# Patient Record
Sex: FEMALE | Race: WHITE | Employment: PART TIME | URBAN - METROPOLITAN AREA
[De-identification: names, ages, dates, MRNs, and addresses within clinical notes are randomized per-mention and may not be internally consistent; named-entity substitution may affect disease eponyms.]

---

## 2020-04-06 LAB — LDL-C, EXTERNAL: 93

## 2020-07-30 LAB — CREATININE, EXTERNAL: 0.8

## 2021-05-03 ENCOUNTER — DOCUMENTATION ONLY (OUTPATIENT)
Dept: INTERNAL MEDICINE CLINIC | Age: 61
End: 2021-05-03

## 2021-05-04 ENCOUNTER — OFFICE VISIT (OUTPATIENT)
Dept: INTERNAL MEDICINE CLINIC | Age: 61
End: 2021-05-04
Payer: COMMERCIAL

## 2021-05-04 ENCOUNTER — HOSPITAL ENCOUNTER (OUTPATIENT)
Dept: LAB | Age: 61
Discharge: HOME | End: 2021-05-04
Payer: COMMERCIAL

## 2021-05-04 VITALS
SYSTOLIC BLOOD PRESSURE: 122 MMHG | WEIGHT: 123.4 LBS | DIASTOLIC BLOOD PRESSURE: 78 MMHG | TEMPERATURE: 97.7 F | RESPIRATION RATE: 16 BRPM | HEIGHT: 68 IN | BODY MASS INDEX: 18.7 KG/M2 | OXYGEN SATURATION: 97 % | HEART RATE: 84 BPM

## 2021-05-04 DIAGNOSIS — L65.8 FEMALE PATTERN HAIR LOSS: ICD-10-CM

## 2021-05-04 DIAGNOSIS — E78.5 HYPERLIPIDEMIA, UNSPECIFIED HYPERLIPIDEMIA TYPE: ICD-10-CM

## 2021-05-04 DIAGNOSIS — G47.00 INSOMNIA, UNSPECIFIED TYPE: ICD-10-CM

## 2021-05-04 DIAGNOSIS — Z00.00 ENCOUNTER FOR ROUTINE HISTORY AND PHYSICAL EXAM IN FEMALE: Primary | ICD-10-CM

## 2021-05-04 DIAGNOSIS — F32.0 CURRENT MILD EPISODE OF MAJOR DEPRESSIVE DISORDER WITHOUT PRIOR EPISODE (HCC): ICD-10-CM

## 2021-05-04 DIAGNOSIS — R63.4 WEIGHT LOSS, UNINTENTIONAL: ICD-10-CM

## 2021-05-04 DIAGNOSIS — Z00.00 LABORATORY TESTS ORDERED AS PART OF A COMPLETE PHYSICAL EXAM (CPE): ICD-10-CM

## 2021-05-04 DIAGNOSIS — Z11.59 NEED FOR HEPATITIS C SCREENING TEST: ICD-10-CM

## 2021-05-04 DIAGNOSIS — E55.9 VITAMIN D DEFICIENCY: ICD-10-CM

## 2021-05-04 DIAGNOSIS — Z00.00 ENCOUNTER FOR ROUTINE HISTORY AND PHYSICAL EXAM IN FEMALE: ICD-10-CM

## 2021-05-04 PROBLEM — F32.A DEPRESSION: Status: ACTIVE | Noted: 2021-05-04

## 2021-05-04 LAB
25(OH)D3 SERPL-MCNC: 19.5 NG/ML (ref 30–100)
ALBUMIN SERPL-MCNC: 4.2 G/DL (ref 3.4–5)
ALBUMIN/GLOB SERPL: 1.4 {RATIO} (ref 0.8–1.7)
ALP SERPL-CCNC: 81 U/L (ref 45–117)
ALT SERPL-CCNC: 25 U/L (ref 13–56)
ANION GAP SERPL CALC-SCNC: 7 MMOL/L (ref 3–18)
APPEARANCE UR: CLEAR
AST SERPL-CCNC: 21 U/L (ref 10–38)
BASOPHILS # BLD: 0 K/UL (ref 0–0.1)
BASOPHILS NFR BLD: 1 % (ref 0–2)
BILIRUB SERPL-MCNC: 0.7 MG/DL (ref 0.2–1)
BILIRUB UR QL: NEGATIVE
BUN SERPL-MCNC: 18 MG/DL (ref 7–18)
BUN/CREAT SERPL: 27 (ref 12–20)
CALCIUM SERPL-MCNC: 9.3 MG/DL (ref 8.5–10.1)
CHLORIDE SERPL-SCNC: 106 MMOL/L (ref 100–111)
CHOLEST SERPL-MCNC: 156 MG/DL
CO2 SERPL-SCNC: 26 MMOL/L (ref 21–32)
COLOR UR: YELLOW
CREAT SERPL-MCNC: 0.67 MG/DL (ref 0.6–1.3)
DIFFERENTIAL METHOD BLD: ABNORMAL
EOSINOPHIL # BLD: 0.1 K/UL (ref 0–0.4)
EOSINOPHIL NFR BLD: 1 % (ref 0–5)
ERYTHROCYTE [DISTWIDTH] IN BLOOD BY AUTOMATED COUNT: 12.3 % (ref 11.6–14.5)
GLOBULIN SER CALC-MCNC: 3.1 G/DL (ref 2–4)
GLUCOSE SERPL-MCNC: 86 MG/DL (ref 74–99)
GLUCOSE UR STRIP.AUTO-MCNC: NEGATIVE MG/DL
HCT VFR BLD AUTO: 43.1 % (ref 35–45)
HDLC SERPL-MCNC: 79 MG/DL (ref 40–60)
HDLC SERPL: 2 {RATIO} (ref 0–5)
HGB BLD-MCNC: 13.9 G/DL (ref 12–16)
HGB UR QL STRIP: NEGATIVE
KETONES UR QL STRIP.AUTO: NEGATIVE MG/DL
LDLC SERPL CALC-MCNC: 66.4 MG/DL (ref 0–100)
LEUKOCYTE ESTERASE UR QL STRIP.AUTO: NEGATIVE
LIPID PROFILE,FLP: ABNORMAL
LYMPHOCYTES # BLD: 1.7 K/UL (ref 0.9–3.6)
LYMPHOCYTES NFR BLD: 29 % (ref 21–52)
MCH RBC QN AUTO: 30 PG (ref 24–34)
MCHC RBC AUTO-ENTMCNC: 32.3 G/DL (ref 31–37)
MCV RBC AUTO: 92.9 FL (ref 74–97)
MONOCYTES # BLD: 0.6 K/UL (ref 0.05–1.2)
MONOCYTES NFR BLD: 11 % (ref 3–10)
NEUTS SEG # BLD: 3.3 K/UL (ref 1.8–8)
NEUTS SEG NFR BLD: 58 % (ref 40–73)
NITRITE UR QL STRIP.AUTO: NEGATIVE
PH UR STRIP: 5.5 [PH] (ref 5–8)
PLATELET # BLD AUTO: 345 K/UL (ref 135–420)
PMV BLD AUTO: 9.5 FL (ref 9.2–11.8)
POTASSIUM SERPL-SCNC: 4.8 MMOL/L (ref 3.5–5.5)
PROT SERPL-MCNC: 7.3 G/DL (ref 6.4–8.2)
PROT UR STRIP-MCNC: NEGATIVE MG/DL
RBC # BLD AUTO: 4.64 M/UL (ref 4.2–5.3)
SODIUM SERPL-SCNC: 139 MMOL/L (ref 136–145)
SP GR UR REFRACTOMETRY: 1.01 (ref 1–1.03)
TRIGL SERPL-MCNC: 53 MG/DL (ref ?–150)
TSH SERPL DL<=0.05 MIU/L-ACNC: 1.61 UIU/ML (ref 0.36–3.74)
UROBILINOGEN UR QL STRIP.AUTO: 0.2 EU/DL (ref 0.2–1)
VLDLC SERPL CALC-MCNC: 10.6 MG/DL
WBC # BLD AUTO: 5.7 K/UL (ref 4.6–13.2)

## 2021-05-04 PROCEDURE — 84402 ASSAY OF FREE TESTOSTERONE: CPT

## 2021-05-04 PROCEDURE — 85025 COMPLETE CBC W/AUTO DIFF WBC: CPT

## 2021-05-04 PROCEDURE — 82306 VITAMIN D 25 HYDROXY: CPT

## 2021-05-04 PROCEDURE — 99386 PREV VISIT NEW AGE 40-64: CPT | Performed by: INTERNAL MEDICINE

## 2021-05-04 PROCEDURE — 82626 DEHYDROEPIANDROSTERONE: CPT

## 2021-05-04 PROCEDURE — 80053 COMPREHEN METABOLIC PANEL: CPT

## 2021-05-04 PROCEDURE — 84443 ASSAY THYROID STIM HORMONE: CPT

## 2021-05-04 PROCEDURE — 81003 URINALYSIS AUTO W/O SCOPE: CPT

## 2021-05-04 PROCEDURE — 80061 LIPID PANEL: CPT

## 2021-05-04 RX ORDER — SPIRONOLACTONE 100 MG/1
TABLET, FILM COATED ORAL
COMMUNITY
Start: 2021-03-31

## 2021-05-04 RX ORDER — ESTRADIOL 0.1 MG/G
CREAM VAGINAL
COMMUNITY
Start: 2021-02-01 | End: 2021-07-10 | Stop reason: ALTCHOICE

## 2021-05-04 RX ORDER — NAPROXEN SODIUM 550 MG/1
550 TABLET ORAL
COMMUNITY
Start: 2020-09-22

## 2021-05-04 RX ORDER — ESZOPICLONE 2 MG/1
TABLET, FILM COATED ORAL
COMMUNITY
Start: 2021-04-06 | End: 2021-05-25 | Stop reason: SDUPTHER

## 2021-05-04 RX ORDER — SERTRALINE HYDROCHLORIDE 25 MG/1
25 TABLET, FILM COATED ORAL DAILY
Qty: 90 TAB | Refills: 2 | Status: SHIPPED | OUTPATIENT
Start: 2021-05-04 | End: 2021-10-03 | Stop reason: ALTCHOICE

## 2021-05-04 RX ORDER — DUTASTERIDE 0.5 MG/1
CAPSULE, LIQUID FILLED ORAL
COMMUNITY
Start: 2021-03-31

## 2021-05-04 RX ORDER — ATORVASTATIN CALCIUM 20 MG/1
TABLET, FILM COATED ORAL
COMMUNITY
Start: 2021-03-15 | End: 2021-05-25 | Stop reason: SDUPTHER

## 2021-05-04 NOTE — PROGRESS NOTES
1. Have you been to the ER, urgent care clinic or hospitalized since your last visit? NO.     2. Have you seen or consulted any other health care providers outside of the [address] [place] [address] since your last visit (Include any pap smears or colon screening)?  Dr. Leonor Matthews Courts former PCP

## 2021-05-04 NOTE — PATIENT INSTRUCTIONS
Heart-Healthy Diet: Care Instructions Your Care Instructions A heart-healthy diet has lots of vegetables, fruits, nuts, beans, and whole grains, and is low in salt. It limits foods that are high in saturated fat, such as meats, cheeses, and fried foods. It may be hard to change your diet, but even small changes can lower your risk of heart attack and heart disease. Follow-up care is a key part of your treatment and safety. Be sure to make and go to all appointments, and call your doctor if you are having problems. It's also a good idea to know your test results and keep a list of the medicines you take. How can you care for yourself at home? Watch your portions · Learn what a serving is. A \"serving\" and a \"portion\" are not always the same thing. Make sure that you are not eating larger portions than are recommended. For example, a serving of pasta is ½ cup. A serving size of meat is 2 to 3 ounces. A 3-ounce serving is about the size of a deck of cards. Measure serving sizes until you are good at [place]" them. Keep in mind that restaurants often serve portions that are 2 or 3 times the size of one serving. · To keep your energy level up and keep you from feeling hungry, eat often but in smaller portions. · Eat only the number of calories you need to stay at a healthy weight. If you need to lose weight, eat fewer calories than your body burns (through exercise and other physical activity). Eat more fruits and vegetables · Eat a variety of fruit and vegetables every day. Dark green, deep orange, red, or yellow fruits and vegetables are especially good for you. Examples include spinach, carrots, peaches, and berries. · Keep carrots, celery, and other veggies handy for snacks. Buy fruit that is in season and store it where you can see it so that you will be tempted to eat it. · Cook dishes that have a lot of veggies in them, such as stir-fries and soups. Limit saturated and trans fat · Read food labels, and try to avoid saturated and trans fats. They increase your risk of heart disease. · Use olive or canola oil when you cook. · Bake, broil, grill, or steam foods instead of frying them. · Choose lean meats instead of high-fat meats such as hot dogs and sausages. Cut off all visible fat when you prepare meat. · Eat fish, skinless poultry, and meat alternatives such as soy products instead of high-fat meats. Soy products, such as tofu, may be especially good for your heart. · Choose low-fat or fat-free milk and dairy products. Eat foods high in fiber · Eat a variety of grain products every day. Include whole-grain foods that have lots of fiber and nutrients. Examples of whole-grain foods include oats, whole wheat bread, and brown rice. · Buy whole-grain breads and cereals, instead of white bread or pastries. Limit salt and sodium · Limit how much salt and sodium you eat to help lower your blood pressure. · Taste food before you salt it. Add only a little salt when you think you need it. With time, your taste buds will adjust to less salt. · Eat fewer snack items, fast foods, and other high-salt, processed foods. Check food labels for the amount of sodium in packaged foods. · Choose low-sodium versions of canned goods (such as soups, vegetables, and beans). Limit sugar · Limit drinks and foods with added sugar. These include candy, desserts, and soda pop. Limit alcohol · Limit alcohol to no more than 2 drinks a day for men and 1 drink a day for women. Too much alcohol can cause health problems. When should you call for help? Watch closely for changes in your health, and be sure to contact your doctor if: 
  · You would like help planning heart-healthy meals. Where can you learn more? Go to http://www.LifeBio.com/ Enter V137 in the search box to learn more about \"Heart-Healthy Diet: Care Instructions. \" Current as of: August 22, 2019               Content Version: 12.6 © 7639-5292 [employer]. Care instructions adapted under license by [employer] (which disclaims liability or warranty for this information). If you have questions about a medical condition or this instruction, always ask your healthcare professional. [address] any warranty or liability for your use of this information. Learning About Low-Sodium Foods What foods are low in sodium? The foods you eat contain nutrients, such as vitamins and minerals. Sodium is a nutrient. Your body needs the right amount to stay healthy and work as it should. You can use the list below to help you make choices about which foods to eat. Fruits · Fresh, frozen, canned, or dried fruit Vegetables · Fresh or frozen vegetables, with no added salt · Canned vegetables, low-sodium or with no added salt Grains · Bagels without salted tops · Cereal with no added salt · Corn tortillas · Crackers with no added salt · Oatmeal, cooked without salt · Popcorn with no salt · Pasta and noodles, cooked without salt · Rice, cooked without salt · Unsalted pretzels Dairy and dairy alternatives · Butter, unsalted · Cream cheese · Ice cream 
· Milk · Soy milk Meats and other protein foods · Beans and peas, canned with no salt · Eggs · Fresh fish (not smoked) · Fresh meats (not smoked or cured) · Nuts and nut butter, prepared without salt · Poultry, not packaged with sodium solution · Tofu, unseasoned · Tuna, canned without salt Seasonings · Garlic · Herbs and spices · Lemon juice · Mustard · Olive oil · Salt-free seasoning mixes · Vinegar Work with your doctor to find out how much of this nutrient you need. Depending on your health, you may need more or less of it in your diet. Where can you learn more? Go to http://www.gray.com/ Enter D573 in the search box to learn more about \"Learning About Low-Sodium Foods. \" Current as of: August 22, 2019               Content Version: 12.6 © 0977-0856 [email or web address]. Care instructions adapted under license by [employer] (which disclaims liability or warranty for this information). If you have questions about a medical condition or this instruction, always ask your healthcare professional. [address] any warranty or liability for your use of this information. Low Sodium Diet (2,000 Milligram): Care Instructions Your Care Instructions Too much sodium causes your body to hold on to extra water. This can raise your blood pressure and force your heart and kidneys to work harder. In very serious cases, this could cause you to be put in the hospital. It might even be life-threatening. By limiting sodium, you will feel better and lower your risk of serious problems. The most common source of sodium is salt. People get most of the salt in their diet from canned, prepared, and packaged foods. Fast food and restaurant meals also are very high in sodium. Your doctor will probably limit your sodium to less than 2,000 milligrams (mg) a day. This limit counts all the sodium in prepared and packaged foods and any salt you add to your food. Follow-up care is a key part of your treatment and safety. Be sure to make and go to all appointments, and call your doctor if you are having problems. It's also a good idea to know your test results and keep a list of the medicines you take. How can you care for yourself at home? Read food labels · Read labels on cans and food packages. The labels tell you how much sodium is in each serving. Make sure that you look at the serving size. If you eat more than the serving size, you have eaten more sodium. · Food labels also tell you the Percent Daily Value for sodium. Choose products with low Percent Daily Values for sodium.  
· Be aware that sodium can come in forms other than salt, including monosodium glutamate (MSG), sodium citrate, and sodium bicarbonate (baking soda). MSG is often added to Asian food. When you eat out, you can sometimes ask for food without MSG or added salt. Buy low-sodium foods · Buy foods that are labeled \"unsalted\" (no salt added), \"sodium-free\" (less than 5 mg of sodium per serving), or \"low-sodium\" (less than 140 mg of sodium per serving). Foods labeled \"reduced-sodium\" and \"light sodium\" may still have too much sodium. Be sure to read the label to see how much sodium you are getting. · Buy fresh vegetables, or frozen vegetables without added sauces. Buy low-sodium versions of canned vegetables, soups, and other canned goods. Prepare low-sodium meals · Cut back on the amount of salt you use in cooking. This will help you adjust to the taste. Do not add salt after cooking. One teaspoon of salt has about 2,300 mg of sodium. · Take the salt shaker off the table. · Flavor your food with garlic, lemon juice, onion, vinegar, herbs, and spices. Do not use soy sauce, lite soy sauce, steak sauce, onion salt, garlic salt, celery salt, mustard, or ketchup on your food. · Use low-sodium salad dressings, sauces, and ketchup. Or make your own salad dressings and sauces without adding salt. · Use less salt (or none) when recipes call for it. You can often use half the salt a recipe calls for without losing flavor. Other foods such as rice, pasta, and grains do not need added salt. · Rinse canned vegetables, and cook them in fresh water. This removes somebut not allof the salt. · Avoid water that is naturally high in sodium or that has been treated with water softeners, which add sodium. Call your local water company to find out the sodium content of your water supply. If you buy bottled water, read the label and choose a sodium-free brand. Avoid high-sodium foods · Avoid eating: 
? Smoked, cured, salted, and canned meat, fish, and poultry. ? Ham, guzman, hot dogs, and luncheon meats.  
? Regular, hard, and processed cheese and regular peanut butter. ? Crackers with salted tops, and other salted snack foods such as pretzels, chips, and salted popcorn. ? Frozen prepared meals, unless labeled low-sodium. ? Canned and dried soups, broths, and bouillon, unless labeled sodium-free or low-sodium. ? Canned vegetables, unless labeled sodium-free or low-sodium. ? [place] fries, pizza, tacos, and other fast foods. ? Pickles, olives, ketchup, and other condiments, especially soy sauce, unless labeled sodium-free or low-sodium. Where can you learn more? Go to http://www.gray.com/ Enter S471 in the search box to learn more about \"Low Sodium Diet (2,000 Milligram): Care Instructions. \" Current as of: August 22, 2019               Content Version: 12.6 © 6038-5563 [employer]. Care instructions adapted under license by [employer] (which disclaims liability or warranty for this information). If you have questions about a medical condition or this instruction, always ask your healthcare professional. [place] [address] any warranty or liability for your use of this information. High Cholesterol: Care Instructions Your Care Instructions Cholesterol is a type of fat in your blood. It is needed for many body functions, such as making new cells. Cholesterol is made by your body. It also comes from food you eat. High cholesterol means that you have too much of the fat in your blood. This raises your risk of a heart attack and stroke. LDL and HDL are part of your total cholesterol. LDL is the \"bad\" cholesterol. High LDL can raise your risk for heart disease, heart attack, and stroke. HDL is the \"good\" cholesterol. It helps clear bad cholesterol from the body. High HDL is linked with a lower risk of heart disease, heart attack, and stroke.  
Your cholesterol levels help your doctor find out your risk for having a heart attack or stroke. You and your doctor can talk about whether you need to lower your risk and what treatment is best for you. A heart-healthy lifestyle along with medicines can help lower your cholesterol and your risk. The way you choose to lower your risk will depend on how high your risk is for heart attack and stroke. It will also depend on how you feel about taking medicines. Follow-up care is a key part of your treatment and safety. Be sure to make and go to all appointments, and call your doctor if you are having problems. It's also a good idea to know your test results and keep a list of the medicines you take. How can you care for yourself at home? · Eat a variety of foods every day. Good choices include fruits, vegetables, whole grains (like oatmeal), dried beans and peas, nuts and seeds, soy products (like tofu), and fat-free or low-fat dairy products. · Replace butter, margarine, and hydrogenated or partially hydrogenated oils with olive and canola oils. (Canola oil margarine without trans fat is fine.) · Replace red meat with fish, poultry, and soy protein (like tofu). · Limit processed and packaged foods like chips, crackers, and cookies. · Bake, broil, or steam foods. Don't lopez them. · Be physically active. Get at least 30 minutes of exercise on most days of the week. Walking is a good choice. You also may want to do other activities, such as running, swimming, cycling, or playing tennis or team sports. · Stay at a healthy weight or lose weight by making the changes in eating and physical activity listed above. Losing just a small amount of weight, even 5 to 10 pounds, can reduce your risk for having a heart attack or stroke. · Do not smoke. When should you call for help? Watch closely for changes in your health, and be sure to contact your doctor if: 
  · You need help making lifestyle changes. · You have questions about your medicine. Where can you learn more?  
Go to http://www.gray.com/ Enter H417 in the search box to learn more about \"High Cholesterol: Care Instructions. \" Current as of: December 16, 2019               Content Version: 12.6 © 6110-7498 [employer]. Care instructions adapted under license by [employer] (which disclaims liability or warranty for this information). If you have questions about a medical condition or this instruction, always ask your healthcare professional. [address] any warranty or liability for your use of this information.

## 2021-05-05 ENCOUNTER — PATIENT MESSAGE (OUTPATIENT)
Dept: INTERNAL MEDICINE CLINIC | Age: 61
End: 2021-05-05

## 2021-05-05 RX ORDER — ERGOCALCIFEROL 1.25 MG/1
50000 CAPSULE ORAL
Qty: 12 CAP | Refills: 1 | Status: SHIPPED | OUTPATIENT
Start: 2021-05-05 | End: 2022-05-08 | Stop reason: ALTCHOICE

## 2021-05-06 LAB — TESTOST FREE SERPL-MCNC: 1.1 PG/ML (ref 0–4.2)

## 2021-05-07 LAB — DHEA SERPL-MCNC: 95 NG/DL (ref 31–701)

## 2021-05-10 ENCOUNTER — TELEPHONE (OUTPATIENT)
Dept: INTERNAL MEDICINE CLINIC | Age: 61
End: 2021-05-10

## 2021-05-10 PROBLEM — R63.4 WEIGHT LOSS, UNINTENTIONAL: Status: ACTIVE | Noted: 2021-05-10

## 2021-05-10 NOTE — TELEPHONE ENCOUNTER
Have requested records. Dr Natacha Vincent is who performed colonoscopy in 2019 and have requested that report.

## 2021-05-10 NOTE — TELEPHONE ENCOUNTER
Please request the following for health maintenance:    1. Mammogram from [facility] (completed 1/28/2021 in [facility])    2. Pap smear/well woman exam from Dr. Jose Nava    3. Colonoscopy report - patient believes it was performed near [facility] two years ago. Likely with [facility] ([facility]) or with [facility]an Forman's group. Please see if can locate. Thank you!

## 2021-05-10 NOTE — PROGRESS NOTES
HPI:   Dana Bradford is a 59y.o. year old female who presents today for a physical exam and to establish care. She is the sister of Divine Worthy and Winter Tejada. She was previously under the care of Berkley Aguirre at [place]. She has a history of hyperlipidemia, insomnia, and androgenic hair loss. She reports that she is doing reasonably well. She has completed the Moderna COVID 19 vaccine series. She does report some symptoms of depression since 10/2020, characterized by sadness, decreased appetite, and decreased weight, which she attributes partly to isolation related to the pandemic. She is no longer exercising, and previously had been walking 3-5 miles per day. She continues to have difficulty with insomnia, and will use Lunesta approximately 2-3 times per month. She does not feel anxiety is contributing to her symptoms. She is otherwise without specific complaints. She has a history of hyperlipidemia, treated with moderate intensity dose atorvastatin. She remains active, and denies any chest pain, shortness of breath at rest or with exertion, palpitations, lightheadedness, or edema. She has a history of migraine headaches, mainly characterized by right sided pain, but states that these have diminished significantly since completing menopause. She denies any aura or visual changes associated with headaches, and had been using naproxen as needed with good response. She has a history of androgenic hair loss, and is currently being treated with spironolactone and dutasteride. She is under the care of Dr. Dale Stubbs. She has a family history of colon cancer in her father and reports having regular colonoscopies. She states that her last colonoscopy was approximately 2 years ago (report unavailable). She has regular well women exams with Dr. Tiffany Thompson and eye exams with Dr. Zenon Hsieh.      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 No pertinent surgical history. Current Outpatient Medications   Medication Sig    spironolactone (ALDACTONE) 100 mg tablet TAKE 1 TABLET BY MOUTH ONCE DAILY    dutasteride (AVODART) 0.5 mg capsule TAKE 1 CAPSULE BY MOUTH ONCE DAILY    atorvastatin (LIPITOR) 20 mg tablet TAKE 1 TABLET BY MOUTH ONCE DAILY AT BEDTIME    eszopiclone (LUNESTA) 2 mg tablet TAKE 1 TABLET BY MOUTH ONCE DAILY AT BEDTIME    naproxen sodium (ANAPROX) 550 mg tablet Take 550 mg by mouth.  estradioL (ESTRACE) 0.01 % (0.1 mg/gram) vaginal cream     sertraline (ZOLOFT) 25 mg tablet Take 1 Tab by mouth daily.  ergocalciferol (ERGOCALCIFEROL) 1,250 mcg (50,000 unit) capsule Take 1 Cap by mouth every seven (7) days. Indications: vitamin D deficiency (high dose therapy)     No current facility-administered medications for this visit. Allergies and Intolerances:   Not on File     Family History: She has no family history of breast cancer. She does have a FH of hyperlipidemia and hypertension. Family History   Problem Relation Age of Onset    Dementia Mother     Colon Cancer Father      Social History:   She  reports that she has never smoked. She has never used smokeless tobacco. She is a . She has two adult daughters. 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 She never smoked and will drink alcohol occasionally, approximately 2 glasses of wine per month.      Social History     Substance and Sexual Activity   Alcohol Use Yes    Frequency: Monthly or less       Immunization History:  Immunization History   Administered Date(s) Administered    Covid-19, MODERNA, Mrna, Lnp-s, Pf, 100mcg/0.5mL 01/14/2021, 02/11/2021    Influenza Vaccine 09/05/2019    Influenza Vaccine (>6 mo Afluria QUAD Vial 55629 (0.25 mL) / 93208 (0.5 mL)) 08/11/2020    Tdap 10/30/2018       Review of Systems:   As above included in HPI. Otherwise 11 point review of systems negative including constitutional, skin, HENT, eyes, respiratory, cardiovascular, gastrointestinal, genitourinary, musculoskeletal, endocrine, hematologic, allergy, and neurologic. Physical:   Visit Vitals  /78   Pulse 84   Temp 97.7 °F (36.5 °C) (Temporal)   Resp 16   Ht 5' 8.3\" (1.735 m)   Wt 123 lb 6.4 oz (56 kg)   SpO2 97%   BMI 18.60 kg/m²       Exam:   Patient appears in no apparent distress. Affect is appropriate. HEENT --Anicteric sclerae, tympanic membranes normal,  ear canals normal.  PERRL, EOMI, conjunctiva and lids normal.  No cervical lymphadenopathy. No thyromegaly, JVD, or bruits. Carotid pulses 2+ with normal upstroke. Lungs --Clear to auscultation. No wheezing or rales. Heart --Regular rate and rhythm, no murmurs, rubs, gallops, or clicks. Abdomen -- Soft and nontender, no hepatosplenomegaly or masses. Extremities -- Without cyanosis, clubbing, edema. Normal looking digits, ROM intact  Neuro -- CN 2-12 intact, strength 5/5 with intact soft touch in all extremities  Derm  no obvious abnormalities noted, no rash      Review of Data:  Labs:  Hospital Outpatient Visit on 05/04/2021   Component Date Value Ref Range Status    WBC 05/04/2021 5.7  4.6 - 13.2 K/uL Final    RBC 05/04/2021 4.64  4.20 - 5.30 M/uL Final    HGB 05/04/2021 13.9  12.0 - 16.0 g/dL Final    HCT 05/04/2021 43.1  35.0 - 45.0 % Final    MCV 05/04/2021 92.9  74.0 - 97.0 FL Final    MCH 05/04/2021 30.0  24.0 - 34.0 PG Final    MCHC 05/04/2021 32.3  31.0 - 37.0 g/dL Final    RDW 05/04/2021 12.3  11.6 - 14.5 % Final    PLATELET 61/26/0109 260  135 - 420 K/uL Final    MPV 05/04/2021 9.5  9.2 - 11.8 FL Final    NEUTROPHILS 05/04/2021 58  40 - 73 % Final    LYMPHOCYTES 05/04/2021 29  21 - 52 % Final    MONOCYTES 05/04/2021 11* 3 - 10 % Final    EOSINOPHILS 05/04/2021 1  0 - 5 % Final    BASOPHILS 05/04/2021 1  0 - 2 % Final    ABS. NEUTROPHILS 05/04/2021 3.3  1.8 - 8.0 K/UL Final    ABS. LYMPHOCYTES 05/04/2021 1.7  0.9 - 3.6 K/UL Final    ABS. MONOCYTES 05/04/2021 0.6  0.05 - 1.2 K/UL Final    ABS. EOSINOPHILS 05/04/2021 0.1  0.0 - 0.4 K/UL Final    ABS. BASOPHILS 05/04/2021 0.0  0.0 - 0.1 K/UL Final    DF 05/04/2021 AUTOMATED    Final    LIPID PROFILE 05/04/2021        Final    Cholesterol, total 05/04/2021 156  <200 MG/DL Final    Triglyceride 05/04/2021 53  <150 MG/DL Final    HDL Cholesterol 05/04/2021 79* 40 - 60 MG/DL Final    LDL, calculated 05/04/2021 66.4  0 - 100 MG/DL Final    VLDL, calculated 05/04/2021 10.6  MG/DL Final    CHOL/HDL Ratio 05/04/2021 2.0  0 - 5.0   Final    Sodium 05/04/2021 139  136 - 145 mmol/L Final    Potassium 05/04/2021 4.8  3.5 - 5.5 mmol/L Final    Chloride 05/04/2021 106  100 - 111 mmol/L Final    CO2 05/04/2021 26  21 - 32 mmol/L Final    Anion gap 05/04/2021 7  3.0 - 18 mmol/L Final    Glucose 05/04/2021 86  74 - 99 mg/dL Final    BUN 05/04/2021 18  7.0 - 18 MG/DL Final    Creatinine 05/04/2021 0.67  0.6 - 1.3 MG/DL Final    BUN/Creatinine ratio 05/04/2021 27* 12 - 20   Final    GFR est AA 05/04/2021 >60  >60 ml/min/1.73m2 Final    GFR est non-AA 05/04/2021 >60  >60 ml/min/1.73m2 Final    Calcium 05/04/2021 9.3  8.5 - 10.1 MG/DL Final    Bilirubin, total 05/04/2021 0.7  0.2 - 1.0 MG/DL Final    ALT (SGPT) 05/04/2021 25  13 - 56 U/L Final    AST (SGOT) 05/04/2021 21  10 - 38 U/L Final    Alk.  phosphatase 05/04/2021 81  45 - 117 U/L Final    Protein, total 05/04/2021 7.3  6.4 - 8.2 g/dL Final    Albumin 05/04/2021 4.2  3.4 - 5.0 g/dL Final    Globulin 05/04/2021 3.1  2.0 - 4.0 g/dL Final    A-G Ratio 05/04/2021 1.4  0.8 - 1.7   Final    TSH 05/04/2021 1.61  0.36 - 3.74 uIU/mL Final    Vitamin D 25-Hydroxy 05/04/2021 19.5* 30 - 100 ng/mL Final    Free testosterone (Direct) 05/04/2021 1.1  0.0 - 4.2 pg/mL Final    Dehydroepiandrosterone (DHEA) 05/04/2021 95  31 - 701 ng/dL Final    Color 05/04/2021 YELLOW    Final    Appearance 05/04/2021 CLEAR    Final    Specific gravity 05/04/2021 1.013  1.005 - 1.030   Final    pH (UA) 05/04/2021 5.5  5.0 - 8.0   Final    Protein 05/04/2021 Negative  NEG mg/dL Final    Glucose 05/04/2021 Negative  NEG mg/dL Final    Ketone 05/04/2021 Negative  NEG mg/dL Final    Bilirubin 05/04/2021 Negative  NEG   Final    Blood 05/04/2021 Negative  NEG   Final    Urobilinogen 05/04/2021 0.2  0.2 - 1.0 EU/dL Final    Nitrites 05/04/2021 Negative  NEG   Final    Leukocyte Esterase 05/04/2021 Negative  NEG   Final         Health Maintenance:  Screening:    Mammogram: negative (1/2021) [facility]   PAP smear: well woman exams with Dr. Tiffany Thompson    Colorectal: colonoscopy (2 years ago) report unavailable   Depression: mild symptoms   DM (HbA1c/FPG): FPG 86 (5/2021)   Hepatitis C: unknown   Falls: none   DEXA: unknown   Glaucoma: regular eye exams with Dr. Sofy Mcdonald (last 3/2021)   Smoking: none   Vitamin D: 19.5 (5/2021)   Medicare Wellness: n/a        Impression:  Patient Active Problem List   Diagnosis Code    Hyperlipidemia E78.5    Female pattern hair loss L65.8    Insomnia G47.00    Depression F32.9    Vitamin D deficiency E55.9    Weight loss, unintentional R63.4       Plan:  1. Hyperlipidemia. On moderate intensity dose atorvastatin with LDL 66 and HDL 79, indicative of excellent control. Continue to follow. 2. Androgenic hair loss. TSH, DHEA, and testosterone normal. Under the care of dermatologist, Dr. Rosa Mccaluey, and being treated with spironolactone and dutasteride. 3. Depression. Patient reports symptoms present for at least 6 months, and associated with nearly 10 pound weight loss. No anxiety or suicidal thoughts. Will begin Zoloft 25 mg daily. Discussed time frame of clinical response and possible side effects. Advised patient to send message in 4 weeks with update regarding response. Urged to call with any side effects or worsening.   4. Insomnia. Chronic problem. Using Lunesta 2-3 times per month with good response. Likely exacerbated by current issues with depression, and will hopefully improve with initiation of SSRI. Follow. 5. History of migraine headaches. Reports usually right sided and would respond to naproxen. Significantly improved since completion of menopause. Will continue to monitor. 6. Vitamin D deficiency. Vitamin D level very low. Will treat with ergocalciferol 50,000 U weekly for 24 weeks, and advised to begin maintenance dose 2000 U supplement after completion. Will reassess at next visit. 7. Unintentional weight loss. Patient reports baseline weight at 132 pounds. Decreased approximately 10 pounds over last 6 months due to depression and decreased appetite. Hopefully will improve with initiation of SSRI. Encouraged to increase protein and caloric intake. Will continue to monitor. 8. Health maintenance. Completed Moderna COVID 19 vaccine series. Urged to obtain Shingrix vaccine series. Other immunizations up to date. Mammogram and well women exams up to date. Will request reports. Colonoscopy completed approximately 2 years ago. Will attempt to locate report. Continue regular eye exams with Dr. Lian Andrade. Wishing to defer baseline bone density study to age 70 due to cost. Will access hepatitis C status at next visit given new recommendations. Vitamin D level low as discussed. Encouraged to resume exercising. Patient understands recommendations and agrees with plan. Patient message in 4 weeks regarding treatment of depression.   Follow-up in 12 months for physical.

## 2021-05-25 ENCOUNTER — PATIENT MESSAGE (OUTPATIENT)
Dept: INTERNAL MEDICINE CLINIC | Age: 61
End: 2021-05-25

## 2021-05-25 DIAGNOSIS — G47.00 INSOMNIA, UNSPECIFIED TYPE: ICD-10-CM

## 2021-05-25 DIAGNOSIS — G47.00 INSOMNIA, UNSPECIFIED TYPE: Primary | ICD-10-CM

## 2021-05-25 RX ORDER — ATORVASTATIN CALCIUM 20 MG/1
TABLET, FILM COATED ORAL
Qty: 90 TABLET | Refills: 3 | Status: SHIPPED | OUTPATIENT
Start: 2021-05-25 | End: 2022-02-24

## 2021-05-25 RX ORDER — ESZOPICLONE 2 MG/1
TABLET, FILM COATED ORAL
Qty: 90 TABLET | Refills: 0 | Status: SHIPPED | OUTPATIENT
Start: 2021-05-25 | End: 2022-04-03

## 2021-05-25 RX ORDER — ESZOPICLONE 2 MG/1
TABLET, FILM COATED ORAL
Qty: 90 TABLET | Refills: 0 | OUTPATIENT
Start: 2021-05-25

## 2021-05-25 RX ORDER — ATORVASTATIN CALCIUM 20 MG/1
TABLET, FILM COATED ORAL
Qty: 90 TABLET | Refills: 3 | OUTPATIENT
Start: 2021-05-25

## 2021-07-06 ENCOUNTER — OFFICE VISIT (OUTPATIENT)
Dept: INTERNAL MEDICINE CLINIC | Age: 61
End: 2021-07-06
Payer: COMMERCIAL

## 2021-07-06 VITALS
TEMPERATURE: 97.3 F | OXYGEN SATURATION: 97 % | DIASTOLIC BLOOD PRESSURE: 64 MMHG | HEIGHT: 68 IN | HEART RATE: 82 BPM | WEIGHT: 124 LBS | SYSTOLIC BLOOD PRESSURE: 106 MMHG | RESPIRATION RATE: 16 BRPM | BODY MASS INDEX: 18.79 KG/M2

## 2021-07-06 DIAGNOSIS — R63.4 WEIGHT LOSS, UNINTENTIONAL: ICD-10-CM

## 2021-07-06 DIAGNOSIS — R22.32 SUBCUTANEOUS NODULE OF LEFT UPPER EXTREMITY: Primary | ICD-10-CM

## 2021-07-06 DIAGNOSIS — F32.0 CURRENT MILD EPISODE OF MAJOR DEPRESSIVE DISORDER WITHOUT PRIOR EPISODE (HCC): ICD-10-CM

## 2021-07-06 PROCEDURE — 99213 OFFICE O/P EST LOW 20 MIN: CPT | Performed by: INTERNAL MEDICINE

## 2021-07-06 NOTE — PROGRESS NOTES
1. Have you been to the ER, urgent care clinic or hospitalized since your last visit? NO.     2. Have you seen or consulted any other health care providers outside of the [address] [place] [address] since your last visit (Include any pap smears or colon screening)?  NO

## 2021-07-06 NOTE — PATIENT INSTRUCTIONS
Heart-Healthy Diet: Care Instructions  Your Care Instructions     A heart-healthy diet has lots of vegetables, fruits, nuts, beans, and whole grains, and is low in salt. It limits foods that are high in saturated fat, such as meats, cheeses, and fried foods. It may be hard to change your diet, but even small changes can lower your risk of heart attack and heart disease. Follow-up care is a key part of your treatment and safety. Be sure to make and go to all appointments, and call your doctor if you are having problems. It's also a good idea to know your test results and keep a list of the medicines you take. How can you care for yourself at home? Watch your portions  · Learn what a serving is. A \"serving\" and a \"portion\" are not always the same thing. Make sure that you are not eating larger portions than are recommended. For example, a serving of pasta is ½ cup. A serving size of meat is 2 to 3 ounces. A 3-ounce serving is about the size of a deck of cards. Measure serving sizes until you are good at [place]" them. Keep in mind that restaurants often serve portions that are 2 or 3 times the size of one serving. · To keep your energy level up and keep you from feeling hungry, eat often but in smaller portions. · Eat only the number of calories you need to stay at a healthy weight. If you need to lose weight, eat fewer calories than your body burns (through exercise and other physical activity). Eat more fruits and vegetables  · Eat a variety of fruit and vegetables every day. Dark green, deep orange, red, or yellow fruits and vegetables are especially good for you. Examples include spinach, carrots, peaches, and berries. · Keep carrots, celery, and other veggies handy for snacks. Buy fruit that is in season and store it where you can see it so that you will be tempted to eat it. · Cook dishes that have a lot of veggies in them, such as stir-fries and soups.   Limit saturated and trans fat  · Read food labels, and try to avoid saturated and trans fats. They increase your risk of heart disease. · Use olive or canola oil when you cook. · Bake, broil, grill, or steam foods instead of frying them. · Choose lean meats instead of high-fat meats such as hot dogs and sausages. Cut off all visible fat when you prepare meat. · Eat fish, skinless poultry, and meat alternatives such as soy products instead of high-fat meats. Soy products, such as tofu, may be especially good for your heart. · Choose low-fat or fat-free milk and dairy products. Eat foods high in fiber  · Eat a variety of grain products every day. Include whole-grain foods that have lots of fiber and nutrients. Examples of whole-grain foods include oats, whole wheat bread, and brown rice. · Buy whole-grain breads and cereals, instead of white bread or pastries. Limit salt and sodium  · Limit how much salt and sodium you eat to help lower your blood pressure. · Taste food before you salt it. Add only a little salt when you think you need it. With time, your taste buds will adjust to less salt. · Eat fewer snack items, fast foods, and other high-salt, processed foods. Check food labels for the amount of sodium in packaged foods. · Choose low-sodium versions of canned goods (such as soups, vegetables, and beans). Limit sugar  · Limit drinks and foods with added sugar. These include candy, desserts, and soda pop. Limit alcohol  · Limit alcohol to no more than 2 drinks a day for men and 1 drink a day for women. Too much alcohol can cause health problems. When should you call for help? Watch closely for changes in your health, and be sure to contact your doctor if:    · You would like help planning heart-healthy meals. Where can you learn more? Go to http://www.[employer].com/  Enter V137 in the search box to learn more about \"Heart-Healthy Diet: Care Instructions. \"  Current as of: August 22, 2019               Content Version: 12.6  © 6009-3076 [employer]. Care instructions adapted under license by [employer] (which disclaims liability or warranty for this information). If you have questions about a medical condition or this instruction, always ask your healthcare professional. [address] any warranty or liability for your use of this information.

## 2021-07-10 NOTE — PROGRESS NOTES
HPI:   Meliton Rubinstein is a 59y.o. year old female who presents today for an acute visit. She has a history of hyperlipidemia, insomnia, and androgenic hair loss. She has completed the Moderna COVID 19 vaccine series. She was last seen on 5/4/2021 and complained of symptoms of depression since 10/2020, characterized by sadness, decreased appetite, and decreased weight, which she attributed to isolation related to the pandemic. She was started on Zoloft 25 mg daily and reports improvement. She presents today for evaluation of a subcutaneous nodule in her left upper arm. She states that it has been present for approximately 4 weeks and is nontender. She states that she has noticed that it has been decreasing in size over the last week, but felt she should keep the appointment since she had already scheduled. She is happier at which time she had her COVID-19 vaccine and first dose of Shingrix vaccine, but believes that they were administered in her right arm. She denies any fever, chills, night sweats, or additional weight loss. She is otherwise without specific complaints. She has a history of hyperlipidemia, treated with moderate intensity dose atorvastatin. She remains active, and denies any chest pain, shortness of breath at rest or with exertion, palpitations, lightheadedness, or edema. She has a history of migraine headaches, mainly characterized by right sided pain, but states that these have diminished significantly since completing menopause. She denies any aura or visual changes associated with headaches, and had been using naproxen as needed with good response. She has a history of androgenic hair loss, and is currently being treated with spironolactone and dutasteride. She is under the care of Dr. Vidhya Castro. She has a family history of colon cancer in her father and reports having regular colonoscopies.  She underwent a screening colonoscopy in 8/2019 by Dr. Bart Michael which was normal.  Follow-up recommended in 5 years. She has regular well women exams with Dr. Nanette Montes and eye exams with Dr. Haim Woodard. Past Medical History:   Diagnosis Date    Female pattern hair loss 5/4/2021    Hx of migraine headaches     resolved after menopause    Hyperlipidemia 5/4/2021    Vitamin D deficiency 5/4/2021     No past surgical history on file. Current Outpatient Medications   Medication Sig    atorvastatin (LIPITOR) 20 mg tablet TAKE 1 TABLET BY MOUTH ONCE DAILY AT BEDTIME    eszopiclone (LUNESTA) 2 mg tablet TAKE 1 TABLET BY MOUTH ONCE DAILY AT BEDTIME    ergocalciferol (ERGOCALCIFEROL) 1,250 mcg (50,000 unit) capsule Take 1 Cap by mouth every seven (7) days. Indications: vitamin D deficiency (high dose therapy)    spironolactone (ALDACTONE) 100 mg tablet TAKE 1 TABLET BY MOUTH ONCE DAILY    dutasteride (AVODART) 0.5 mg capsule TAKE 1 CAPSULE BY MOUTH ONCE DAILY    naproxen sodium (ANAPROX) 550 mg tablet Take 550 mg by mouth.  sertraline (ZOLOFT) 25 mg tablet Take 1 Tab by mouth daily. No current facility-administered medications for this visit. Allergies and Intolerances:   Not on File     Family History: She has no family history of breast cancer. She does have a FH of hyperlipidemia and hypertension. Family History   Problem Relation Age of Onset    Dementia Mother     Colon Cancer Father      Social History:   She  reports that she has never smoked. She has never used smokeless tobacco. She is a . She has two adult daughters. 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 She never smoked and will drink alcohol occasionally, approximately 2 glasses of wine per month.      Social History     Substance and Sexual Activity   Alcohol Use Yes       Immunization History:  Immunization History   Administered Date(s) Administered    Covid-19, MODERNA, Mrna, Lnp-s, Pf, 100mcg/0.5mL 01/14/2021, 02/11/2021    Influenza Vaccine 09/05/2019    Influenza Vaccine (>6 mo Afluria QUAD Vial 76448 (0.25 mL) / 21908 (0.5 mL)) 08/11/2020    Tdap 01/01/2018, 10/30/2018    Zoster Recombinant 05/11/2021       Review of Systems:   As above included in HPI. Otherwise 11 point review of systems negative including constitutional, skin, HENT, eyes, respiratory, cardiovascular, gastrointestinal, genitourinary, musculoskeletal, endocrine, hematologic, allergy, and neurologic. Physical:   Visit Vitals  /64 (BP 1 Location: Left arm, BP Patient Position: Sitting)   Pulse 82   Temp 97.3 °F (36.3 °C) (Temporal)   Resp 16   Ht 5' 8.3\" (1.735 m)   Wt 124 lb (56.2 kg)   SpO2 97%   BMI 18.69 kg/m²       Exam:   Patient appears in no apparent distress. Affect is appropriate. HEENT: PERRLA, anicteric, oropharynx clear, no JVD, adenopathy or thyromegaly. No carotid bruits or radiated murmur. Lungs: clear to auscultation, no wheezes, rhonchi, or rales. Heart: regular rate and rhythm. No murmur, rubs, gallops  Abdomen: soft, nontender, nondistended, normal bowel sounds, no hepatosplenomegaly or masses. Extremities: without edema. Left upper arm with 2 to 3 cm soft mobile subcutaneous nodule on the anterior surface within the biceps muscle, non-tender to palpation. Review of Data:  Labs:  No visits with results within 2 Day(s) from this visit.    Latest known visit with results is:   Hospital Outpatient Visit on 05/04/2021   Component Date Value Ref Range Status    WBC 05/04/2021 5.7  4.6 - 13.2 K/uL Final    RBC 05/04/2021 4.64  4.20 - 5.30 M/uL Final    HGB 05/04/2021 13.9  12.0 - 16.0 g/dL Final    HCT 05/04/2021 43.1  35.0 - 45.0 % Final    MCV 05/04/2021 92.9  74.0 - 97.0 FL Final    MCH 05/04/2021 30.0  24.0 - 34.0 PG Final    MCHC 05/04/2021 32.3  31.0 - 37.0 g/dL Final    RDW 05/04/2021 12.3  11.6 - 14.5 % Final    PLATELET 71/26/2042 516  135 - 420 K/uL Final    MPV 05/04/2021 9.5  9.2 - 11.8 FL Final    NEUTROPHILS 05/04/2021 58  40 - 73 % Final    LYMPHOCYTES 05/04/2021 29  21 - 52 % Final    MONOCYTES 05/04/2021 11* 3 - 10 % Final    EOSINOPHILS 05/04/2021 1  0 - 5 % Final    BASOPHILS 05/04/2021 1  0 - 2 % Final    ABS. NEUTROPHILS 05/04/2021 3.3  1.8 - 8.0 K/UL Final    ABS. LYMPHOCYTES 05/04/2021 1.7  0.9 - 3.6 K/UL Final    ABS. MONOCYTES 05/04/2021 0.6  0.05 - 1.2 K/UL Final    ABS. EOSINOPHILS 05/04/2021 0.1  0.0 - 0.4 K/UL Final    ABS. BASOPHILS 05/04/2021 0.0  0.0 - 0.1 K/UL Final    DF 05/04/2021 AUTOMATED    Final    LIPID PROFILE 05/04/2021        Final    Cholesterol, total 05/04/2021 156  <200 MG/DL Final    Triglyceride 05/04/2021 53  <150 MG/DL Final    HDL Cholesterol 05/04/2021 79* 40 - 60 MG/DL Final    LDL, calculated 05/04/2021 66.4  0 - 100 MG/DL Final    VLDL, calculated 05/04/2021 10.6  MG/DL Final    CHOL/HDL Ratio 05/04/2021 2.0  0 - 5.0   Final    Sodium 05/04/2021 139  136 - 145 mmol/L Final    Potassium 05/04/2021 4.8  3.5 - 5.5 mmol/L Final    Chloride 05/04/2021 106  100 - 111 mmol/L Final    CO2 05/04/2021 26  21 - 32 mmol/L Final    Anion gap 05/04/2021 7  3.0 - 18 mmol/L Final    Glucose 05/04/2021 86  74 - 99 mg/dL Final    BUN 05/04/2021 18  7.0 - 18 MG/DL Final    Creatinine 05/04/2021 0.67  0.6 - 1.3 MG/DL Final    BUN/Creatinine ratio 05/04/2021 27* 12 - 20   Final    GFR est AA 05/04/2021 >60  >60 ml/min/1.73m2 Final    GFR est non-AA 05/04/2021 >60  >60 ml/min/1.73m2 Final    Calcium 05/04/2021 9.3  8.5 - 10.1 MG/DL Final    Bilirubin, total 05/04/2021 0.7  0.2 - 1.0 MG/DL Final    ALT (SGPT) 05/04/2021 25  13 - 56 U/L Final    AST (SGOT) 05/04/2021 21  10 - 38 U/L Final    Alk.  phosphatase 05/04/2021 81  45 - 117 U/L Final    Protein, total 05/04/2021 7.3  6.4 - 8.2 g/dL Final    Albumin 05/04/2021 4.2  3.4 - 5.0 g/dL Final    Globulin 05/04/2021 3.1  2.0 - 4.0 g/dL Final    A-G Ratio 05/04/2021 1.4  0.8 - 1.7   Final    TSH 05/04/2021 1.61  0.36 - 3.74 uIU/mL Final    Vitamin D 25-Hydroxy 05/04/2021 19.5* 30 - 100 ng/mL Final    Free testosterone (Direct) 05/04/2021 1.1  0.0 - 4.2 pg/mL Final    Dehydroepiandrosterone (DHEA) 05/04/2021 95  31 - 701 ng/dL Final    Color 05/04/2021 YELLOW    Final    Appearance 05/04/2021 CLEAR    Final    Specific gravity 05/04/2021 1.013  1.005 - 1.030   Final    pH (UA) 05/04/2021 5.5  5.0 - 8.0   Final    Protein 05/04/2021 Negative  NEG mg/dL Final    Glucose 05/04/2021 Negative  NEG mg/dL Final    Ketone 05/04/2021 Negative  NEG mg/dL Final    Bilirubin 05/04/2021 Negative  NEG   Final    Blood 05/04/2021 Negative  NEG   Final    Urobilinogen 05/04/2021 0.2  0.2 - 1.0 EU/dL Final    Nitrites 05/04/2021 Negative  NEG   Final    Leukocyte Esterase 05/04/2021 Negative  NEG   Final         Health Maintenance:  Screening:    Mammogram: negative (1/2021) [facility]   PAP smear: well woman exams with Dr. Yudelka Earl    Colorectal: colonoscopy (8/2019) normal.  Dr. Hebert Duarte. Due 8/2024. Depression: mild symptoms   DM (HbA1c/FPG): FPG 86 (5/2021)   Hepatitis C: unknown   Falls: none   DEXA: unknown   Glaucoma: regular eye exams with Dr. Carlos Eduardo Cassidy (last 3/2021)   Smoking: none   Vitamin D: 19.5 (5/2021)   Medicare Wellness: n/a        Impression:  Patient Active Problem List   Diagnosis Code    Hyperlipidemia E78.5    Female pattern hair loss L65.8    Insomnia G47.00    Depression F32.9    Vitamin D deficiency E55.9    Weight loss, unintentional R63.4       Plan:  Subcutaneous nodule. Patient reports noting nodule in her left upper arm for approximately 4 weeks. Reports that over the last week, it has been decreasing in size but wished to have it evaluated. Unclear if same arm of COVID-19 or Shingrix vaccination, although patient believes that she received the vaccinations in her right arm. Advised to continue to monitor and if not improved in 4 to 6 weeks, will proceed with soft tissue ultrasound to evaluate further. Answered all questions. Other chronic issues:  1. Hyperlipidemia. On moderate intensity dose atorvastatin with LDL 66 and HDL 79, indicative of excellent control. Continue to follow. 2. Androgenic hair loss. TSH, DHEA, and testosterone normal. Under the care of dermatologist, Dr. Aiden Lowe, and being treated with spironolactone and dutasteride. Appears to be remaining stable. 3. Depression. Patient reported symptoms present for at least 6 months, and associated with nearly 10 pound weight loss. No anxiety or suicidal thoughts. Started on Zoloft 25 mg daily and finding to be helpful. Patient questioned this method for discontinuation if desires, and advised to take every other day for 1 to 2 weeks prior to discontinuing. 4. Insomnia. Chronic problem. Using Lunesta 2-3 times per month with good response. Likely exacerbated by issues with depression. Will continue to monitor. 5. History of migraine headaches. Reports usually right sided and would respond to naproxen. Significantly improved since completion of menopause. Will continue to monitor. 6. Vitamin D deficiency. Vitamin D level very low in 5/2021. Prescribed ergocalciferol 50,000 U weekly for 24 weeks, and advised to begin maintenance dose 2000 U supplement after completion. Will reassess at next visit. 7. Unintentional weight loss. Patient reported baseline weight at 132 pounds. Decreased approximately 10 pounds since 11/2020 due to depression and decreased appetite. Started on Zoloft at last visit with good response. Weight stable today. Encouraged to continue to increase protein and caloric intake. Will continue to monitor. 8. Health maintenance. Completed Moderna COVID 19 vaccine series. Received 1/2 doses of Shingrix vaccine series. Other immunizations up to date. Mammogram and well women exams up to date. Colonoscopy completed in 8/2019. Report obtained. Continue regular eye exams with Dr. Jason Albrecht.  Wishing to defer baseline bone density study to age 70 due to cost. Will access hepatitis C status at next visit given new recommendations. Vitamin D level low as discussed. Encouraged to resume exercising. Patient understands recommendations and agrees with plan. Follow-up as previously scheduled.

## 2021-09-29 ENCOUNTER — HOSPITAL ENCOUNTER (OUTPATIENT)
Dept: GENERAL RADIOLOGY | Age: 61
Discharge: HOME | End: 2021-09-29
Payer: COMMERCIAL

## 2021-09-29 ENCOUNTER — OFFICE VISIT (OUTPATIENT)
Dept: INTERNAL MEDICINE CLINIC | Age: 61
End: 2021-09-29
Payer: COMMERCIAL

## 2021-09-29 VITALS
RESPIRATION RATE: 16 BRPM | DIASTOLIC BLOOD PRESSURE: 64 MMHG | TEMPERATURE: 97.7 F | SYSTOLIC BLOOD PRESSURE: 110 MMHG | WEIGHT: 126 LBS | HEIGHT: 68 IN | HEART RATE: 63 BPM | BODY MASS INDEX: 19.1 KG/M2 | OXYGEN SATURATION: 100 %

## 2021-09-29 DIAGNOSIS — R63.4 WEIGHT LOSS, UNINTENTIONAL: ICD-10-CM

## 2021-09-29 DIAGNOSIS — F32.5 MAJOR DEPRESSIVE DISORDER WITH SINGLE EPISODE, IN FULL REMISSION (HCC): ICD-10-CM

## 2021-09-29 DIAGNOSIS — M25.532 LEFT WRIST PAIN: ICD-10-CM

## 2021-09-29 DIAGNOSIS — M79.645 PAIN OF LEFT THUMB: ICD-10-CM

## 2021-09-29 DIAGNOSIS — M25.532 LEFT WRIST PAIN: Primary | ICD-10-CM

## 2021-09-29 DIAGNOSIS — R22.32 SUBCUTANEOUS NODULE OF LEFT UPPER EXTREMITY: ICD-10-CM

## 2021-09-29 PROCEDURE — 99213 OFFICE O/P EST LOW 20 MIN: CPT | Performed by: INTERNAL MEDICINE

## 2021-09-29 PROCEDURE — 73100 X-RAY EXAM OF WRIST: CPT

## 2021-09-29 PROCEDURE — 73140 X-RAY EXAM OF FINGER(S): CPT

## 2021-09-29 NOTE — PROGRESS NOTES
Called and discussed x-ray results with patient. Evidence of mild first CMC osteoarthritis as the likely cause for her discomfort. Discussed conservative measures to help control pain.

## 2021-09-29 NOTE — PATIENT INSTRUCTIONS
Heart-Healthy Diet: Care Instructions  Your Care Instructions     A heart-healthy diet has lots of vegetables, fruits, nuts, beans, and whole grains, and is low in salt. It limits foods that are high in saturated fat, such as meats, cheeses, and fried foods. It may be hard to change your diet, but even small changes can lower your risk of heart attack and heart disease. Follow-up care is a key part of your treatment and safety. Be sure to make and go to all appointments, and call your doctor if you are having problems. It's also a good idea to know your test results and keep a list of the medicines you take. How can you care for yourself at home? Watch your portions  · Learn what a serving is. A \"serving\" and a \"portion\" are not always the same thing. Make sure that you are not eating larger portions than are recommended. For example, a serving of pasta is ½ cup. A serving size of meat is 2 to 3 ounces. A 3-ounce serving is about the size of a deck of cards. Measure serving sizes until you are good at [place]" them. Keep in mind that restaurants often serve portions that are 2 or 3 times the size of one serving. · To keep your energy level up and keep you from feeling hungry, eat often but in smaller portions. · Eat only the number of calories you need to stay at a healthy weight. If you need to lose weight, eat fewer calories than your body burns (through exercise and other physical activity). Eat more fruits and vegetables  · Eat a variety of fruit and vegetables every day. Dark green, deep orange, red, or yellow fruits and vegetables are especially good for you. Examples include spinach, carrots, peaches, and berries. · Keep carrots, celery, and other veggies handy for snacks. Buy fruit that is in season and store it where you can see it so that you will be tempted to eat it. · Cook dishes that have a lot of veggies in them, such as stir-fries and soups.   Limit saturated and trans fat  · Read food labels, and try to avoid saturated and trans fats. They increase your risk of heart disease. · Use olive or canola oil when you cook. · Bake, broil, grill, or steam foods instead of frying them. · Choose lean meats instead of high-fat meats such as hot dogs and sausages. Cut off all visible fat when you prepare meat. · Eat fish, skinless poultry, and meat alternatives such as soy products instead of high-fat meats. Soy products, such as tofu, may be especially good for your heart. · Choose low-fat or fat-free milk and dairy products. Eat foods high in fiber  · Eat a variety of grain products every day. Include whole-grain foods that have lots of fiber and nutrients. Examples of whole-grain foods include oats, whole wheat bread, and brown rice. · Buy whole-grain breads and cereals, instead of white bread or pastries. Limit salt and sodium  · Limit how much salt and sodium you eat to help lower your blood pressure. · Taste food before you salt it. Add only a little salt when you think you need it. With time, your taste buds will adjust to less salt. · Eat fewer snack items, fast foods, and other high-salt, processed foods. Check food labels for the amount of sodium in packaged foods. · Choose low-sodium versions of canned goods (such as soups, vegetables, and beans). Limit sugar  · Limit drinks and foods with added sugar. These include candy, desserts, and soda pop. Limit alcohol  · Limit alcohol to no more than 2 drinks a day for men and 1 drink a day for women. Too much alcohol can cause health problems. When should you call for help? Watch closely for changes in your health, and be sure to contact your doctor if:    · You would like help planning heart-healthy meals. Where can you learn more? Go to http://www."[employer]".com/  Enter V137 in the search box to learn more about \"Heart-Healthy Diet: Care Instructions. \"  Current as of: August 22, 2019               Content Version: 12.6  © 4773-6642 [employer]. Care instructions adapted under license by [employer] (which disclaims liability or warranty for this information). If you have questions about a medical condition or this instruction, always ask your healthcare professional. [address] any warranty or liability for your use of this information.

## 2021-09-29 NOTE — PROGRESS NOTES
1. Have you been to the ER, urgent care clinic or hospitalized since your last visit? NO.     2. Have you seen or consulted any other health care providers outside of the [address] [place] [address] since your last visit (Include any pap smears or colon screening)?  NO

## 2021-10-03 NOTE — PROGRESS NOTES
AndHPI:   Carmel Kay is a 59y.o. year old female who presents today for an acute visit. She has a history of hyperlipidemia, insomnia, and androgenic hair loss. She has completed the Moderna COVID 19 vaccine series. She was last seen on 7/6/2021 for evaluation of a subcutaneous nodule in her left upper arm which she noted have been present for approximately 4 weeks although appeared to be decreasing in size. She did note that it developed after receiving her Moderna COVID-19 vaccine and first dose of Shingrix vaccine, although it was unclear into which arm they were were administered. She reports today that the nodule continues to be decreasing in size and appears to be resolving. She denies any fever, chills, night sweats, or weight loss. She does complain of new left wrist pain which has been present for several weeks. 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 She denies any redness, swelling, warmth, hand paresthesias, or known injury. She denies any other joint pain. She is otherwise without new complaints and feeling overall well. Summary of prior hospitalizations and medical history:  She has a history of hyperlipidemia, treated with moderate intensity dose atorvastatin. She remains active, and denies any chest pain, shortness of breath at rest or with exertion, palpitations, lightheadedness, or edema. She has a history of migraine headaches, mainly characterized by right sided pain, but states that these have diminished significantly since completing menopause. She denies any aura or visual changes associated with headaches, and had been using naproxen as needed with good response. She has a history of androgenic hair loss, and is currently being treated with spironolactone and dutasteride. She is under the care of Dr. Tiera Neville.      She has a family history of colon cancer in her father and reports having regular colonoscopies. She underwent a screening colonoscopy in 8/2019 by Dr. Prabhu Oliveira which was normal.  Follow-up recommended in 5 years. She has regular well women exams with Dr. Nellene Bamberger and eye exams with Dr. Evens Hankins. In 5/2021, she complained of symptoms of depression since 10/2020, characterized by sadness, decreased appetite, and decreased weight, which she attributed to isolation related to the pandemic. She was started on Zoloft 25 mg daily with improvement. However, reports no longer taking since symptoms have improved. Past Medical History:   Diagnosis Date    Female pattern hair loss 5/4/2021    Hx of migraine headaches     resolved after menopause    Hyperlipidemia 5/4/2021    Vitamin D deficiency 5/4/2021     No past surgical history on file. Current Outpatient Medications   Medication Sig    atorvastatin (LIPITOR) 20 mg tablet TAKE 1 TABLET BY MOUTH ONCE DAILY AT BEDTIME    eszopiclone (LUNESTA) 2 mg tablet TAKE 1 TABLET BY MOUTH ONCE DAILY AT BEDTIME    ergocalciferol (ERGOCALCIFEROL) 1,250 mcg (50,000 unit) capsule Take 1 Cap by mouth every seven (7) days. Indications: vitamin D deficiency (high dose therapy)    spironolactone (ALDACTONE) 100 mg tablet TAKE 1 TABLET BY MOUTH ONCE DAILY    dutasteride (AVODART) 0.5 mg capsule TAKE 1 CAPSULE BY MOUTH ONCE DAILY    naproxen sodium (ANAPROX) 550 mg tablet Take 550 mg by mouth. No current facility-administered medications for this visit. Allergies and Intolerances:   Not on File     Family History: She has no family history of breast cancer. She does have a FH of hyperlipidemia and hypertension. Family History   Problem Relation Age of Onset    Dementia Mother     Colon Cancer Father      Social History:   She  reports that she has never smoked. She has never used smokeless tobacco. She is a . She has two adult daughters.  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 She never smoked and will drink alcohol occasionally, approximately 2 glasses of wine per month. Social History     Substance and Sexual Activity   Alcohol Use Yes       Immunization History:  Immunization History   Administered Date(s) Administered    Covid-19, MODERNA, Mrna, Lnp-s, Pf, 100mcg/0.5mL 01/14/2021, 02/11/2021    Influenza Vaccine 09/05/2019, 08/28/2021    Influenza Vaccine (>6 mo Afluria QUAD Vial 80353 (0.25 mL) / 18682 (0.5 mL)) 08/11/2020    Tdap 01/01/2018, 10/30/2018    Zoster Recombinant 05/11/2021, 07/20/2021       Review of Systems:   As above included in HPI. Otherwise 11 point review of systems negative including constitutional, skin, HENT, eyes, respiratory, cardiovascular, gastrointestinal, genitourinary, musculoskeletal, endocrine, hematologic, allergy, and neurologic. Physical:   Visit Vitals  /64 (BP 1 Location: Left arm, BP Patient Position: Sitting)   Pulse 63   Temp 97.7 °F (36.5 °C) (Temporal)   Resp 16   Ht 5' 8.3\" (1.735 m)   Wt 126 lb (57.2 kg)   SpO2 100%   BMI 18.99 kg/m²       Exam:   Patient appears in no apparent distress. Affect is appropriate. HEENT: PERRLA, anicteric, no JVD, adenopathy or thyromegaly. Lungs: clear to auscultation, no wheezes, rhonchi, or rales. Heart: regular rate and rhythm. No murmur, rubs, gallops  Abdomen: soft, nontender, nondistended, normal bowel sounds, no hepatosplenomegaly or masses. Extremities: without edema. Left upper arm with 1-2 cm firm mobile subcutaneous nodule on the anterior surface within the biceps muscle, non-tender to palpation; left wrist with bony prominence over [facility] joint, no tenderness to palpation, no erythema or warmth, range of motion intact without reproducing pain. Review of Data:  Labs:  No visits with results within 2 Day(s) from this visit.    Latest known visit with results is:   Abstract on 08/27/2021   Component Date Value Ref Range Status    LDL-C, External 04/06/2020 93   Final    Creatinine, External 07/30/2020 0.8   Final         Health Maintenance:  Screening:    Mammogram: negative (1/2021) Shashank   PAP smear: well woman exams with Dr. Randy Dixon    Colorectal: colonoscopy (8/2019) normal.  Dr. Suzanne Ladd. Due 8/2024. Depression: mild symptoms   DM (HbA1c/FPG): FPG 86 (5/2021)   Hepatitis C: unknown   Falls: none   DEXA: unknown   Glaucoma: regular eye exams with Dr. Lori Faith (last 3/2021)   Smoking: none   Vitamin D: 19.5 (5/2021)   Medicare Wellness: n/a        Impression:  Patient Active Problem List   Diagnosis Code    Hyperlipidemia E78.5    Female pattern hair loss L65.8    Insomnia G47.00    Depression F32.9    Vitamin D deficiency E55.9    Weight loss, unintentional R63.4       Plan:  Left wrist pain. No evidence of active synovitis on exam today. 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  Noting improvement with use of naproxen. Will proceed with left thumb and left wrist x-rays with further recommendations after review. Advised to minimize NSAID use and may apply topical Voltaren gel. Also discussed use of a left wrist brace when working for added support. Subcutaneous nodule. Patient reported noting nodule in her left upper arm in 7/2021. Unclear if related to COVID-19 or Shingrix vaccines. Continuing to decrease in size and confirmed on exam today. Advised continue observation given gradual improvement. Other chronic issues:  1. Hyperlipidemia. On moderate intensity dose atorvastatin with LDL 66 and HDL 79 (5/2021), indicative of excellent control. Continue to follow. 2. Androgenic hair loss. TSH, DHEA, and testosterone normal (5/2021). Under the care of dermatologist, Dr. Jouradn Lewis, and being treated with spironolactone and dutasteride. Appears to be remaining stable. 3. Depression.   In 5/2021, patient reported symptoms present for at least 6 months, and associated with nearly 10 pound weight loss. No anxiety or suicidal thoughts. Started on Zoloft 25 mg daily and found to be helpful. Discussed discontinuation at her last visit in 7/2021, and reports today that has weaned from taking it since symptoms have improved. Weight also increased 3 pounds since her last visit. Will continue to monitor. 4. Insomnia. Chronic problem. Using Lunesta 2-3 times per month with good response. Likely was exacerbated by issues with depression. Will continue to monitor. 5. History of migraine headaches. Reports usually right sided and would respond to naproxen. Significantly improved since completion of menopause. Will continue to monitor. 6. Vitamin D deficiency. Vitamin D level very low in 5/2021. Prescribed ergocalciferol 50,000 U weekly for 24 weeks, and advised to begin maintenance dose 2000 U supplement after completion. Will reassess at next visit. 7. Unintentional weight loss. Patient reported baseline weight at 132 pounds. Decreased approximately 10 pounds since 11/2020 due to depression and decreased appetite. Started on Zoloft at last visit with good response. Reports improvement in symptoms today, and no longer taking Zoloft. Weight increased 3 pounds since last visit, also indicative of improvement. Encouraged to continue to increase protein and caloric intake. Will continue to monitor. 8. Health maintenance. Completed Moderna COVID 19 vaccine series. Completed 2/2 doses of Shingrix vaccine. Will give influenza vaccine today. Other immunizations up to date. Mammogram and well women exams up to date. Colonoscopy completed in 8/2019. Report obtained. Continue regular eye exams with Dr. Dwayne Ball. Wishing to defer baseline bone density study to age 70 due to cost. Will access hepatitis C status at next visit given new recommendations. Vitamin D level low as discussed.   Reports has now resumed exercising by walking. Patient understands recommendations and agrees with plan. Follow-up as previously scheduled.

## 2022-01-25 ENCOUNTER — PATIENT MESSAGE (OUTPATIENT)
Dept: INTERNAL MEDICINE CLINIC | Age: 62
End: 2022-01-25

## 2022-01-25 NOTE — TELEPHONE ENCOUNTER
Bienvenido Gutierrez [facility]  Subject: Referral Request     QUESTIONS   Reason for referral request? Patient is asking for referral for plastic   surgeon   Has the physician seen you for this condition before? No   Preferred Specialist (if applicable)? Do you already have an appointment scheduled? No   Additional Information for Provider? Please call patient . She would like   to speak over options . Please call   ---------------------------------------------------------------------------   --------------   CALL BACK INFO   What is the best way for the office to contact you? OK to leave message on   voicemail   Preferred Call Back Phone Number?  [phone]

## 2022-01-25 NOTE — TELEPHONE ENCOUNTER
Patient states she called and spoke with Marah Renteria in our front office who stated fax was received and is up front in  box. Patient is asking for  to review the report and recommend a referral for a plastic surgeon.

## 2022-01-26 NOTE — TELEPHONE ENCOUNTER
Reviewed pathology. Melanoma in situ on biopsy of right upper anterior thigh lesion (margins positive). 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 Called and spoke with patient. Reports that [facility] dermatology did schedule her an appointment with a plastic surgeon, Dr. Gracie Harrison, on 1/28/2022.

## 2022-03-14 ENCOUNTER — TELEPHONE (OUTPATIENT)
Dept: INTERNAL MEDICINE CLINIC | Age: 62
End: 2022-03-14

## 2022-03-14 DIAGNOSIS — Z11.3 SCREENING FOR STDS (SEXUALLY TRANSMITTED DISEASES): Primary | ICD-10-CM

## 2022-03-14 NOTE — TELEPHONE ENCOUNTER
Called and spoke with patient she reports she has been a  now for 14 years. 2 years ago she had sexual relations with a partner and has never been checked for any STD'S. Patient reports having a recent pap that came back as abnormal. Since this has occurred she started thinking about the last partner she had and the fact she has never been tested for any STD'S. Patient denies any symptoms or discomfort, no discharge or current issues other than wanting this done for peace of mind. Patient has routine labs scheduled for 4/28/22 and would like to do these now if possible so she doesn't have to be stuck twice. If its too early she will wait on routine labs but she wants STD labs done asap if possible.

## 2022-03-14 NOTE — TELEPHONE ENCOUNTER
Pt called asking if Dr Mary Jo Roque could put orders in for her to get bloodwork checking for any type of  STD she would like to have it done asap please advise

## 2022-03-14 NOTE — TELEPHONE ENCOUNTER
Patient decided she is going to check with her insurance company and see if this will be covered before proceeding. She has talked with her GYN since this morning and they are not overly concerned that she needs this testing since she is not presenting with any symptoms of an STD/STI.

## 2022-03-14 NOTE — TELEPHONE ENCOUNTER
Please ask her to let us know if she would like to proceed with testing. If not, will need to cancel orders so they are not drawn at her lab appointment in 4/2022. Thanks.

## 2022-03-14 NOTE — TELEPHONE ENCOUNTER
STD lab orders placed. She may go ahead and get her routine physical labs done now with these labs as requested.

## 2022-03-15 NOTE — TELEPHONE ENCOUNTER
Lilliana Galeano \"Nimo\"  to Hubert Mata MD        3/15/22 8:24 AM  Please disregard my request for lab work. My gynecologist said the lab tests were not warranted. I jumped the gun before I got a return call from their office. Sorry for any confusion I may have caused.

## 2022-03-18 PROBLEM — R63.4 WEIGHT LOSS, UNINTENTIONAL: Status: ACTIVE | Noted: 2021-05-10

## 2022-03-18 PROBLEM — F32.A DEPRESSION: Status: ACTIVE | Noted: 2021-05-04

## 2022-03-19 PROBLEM — G47.00 INSOMNIA: Status: ACTIVE | Noted: 2021-05-04

## 2022-03-19 PROBLEM — E78.5 HYPERLIPIDEMIA: Status: ACTIVE | Noted: 2021-05-04

## 2022-03-19 PROBLEM — L65.8 FEMALE PATTERN HAIR LOSS: Status: ACTIVE | Noted: 2021-05-04

## 2022-03-20 PROBLEM — E55.9 VITAMIN D DEFICIENCY: Status: ACTIVE | Noted: 2021-05-04

## 2022-04-03 DIAGNOSIS — G47.00 INSOMNIA, UNSPECIFIED TYPE: ICD-10-CM

## 2022-04-03 RX ORDER — ESZOPICLONE 2 MG/1
TABLET, FILM COATED ORAL
Qty: 90 TABLET | Refills: 0 | Status: SHIPPED | OUTPATIENT
Start: 2022-04-03

## 2022-04-28 ENCOUNTER — APPOINTMENT (OUTPATIENT)
Dept: INTERNAL MEDICINE CLINIC | Age: 62
End: 2022-04-28

## 2022-04-28 ENCOUNTER — HOSPITAL ENCOUNTER (OUTPATIENT)
Dept: LAB | Age: 62
Discharge: HOME | End: 2022-04-28
Payer: COMMERCIAL

## 2022-04-28 DIAGNOSIS — Z11.59 NEED FOR HEPATITIS C SCREENING TEST: ICD-10-CM

## 2022-04-28 DIAGNOSIS — Z00.00 LABORATORY TESTS ORDERED AS PART OF A COMPLETE PHYSICAL EXAM (CPE): ICD-10-CM

## 2022-04-28 DIAGNOSIS — E78.5 HYPERLIPIDEMIA, UNSPECIFIED HYPERLIPIDEMIA TYPE: ICD-10-CM

## 2022-04-28 DIAGNOSIS — E55.9 VITAMIN D DEFICIENCY: ICD-10-CM

## 2022-04-28 LAB
25(OH)D3 SERPL-MCNC: 42.4 NG/ML (ref 30–100)
ALBUMIN SERPL-MCNC: 4.3 G/DL (ref 3.4–5)
ALBUMIN/GLOB SERPL: 1.4 {RATIO} (ref 0.8–1.7)
ALP SERPL-CCNC: 83 U/L (ref 45–117)
ALT SERPL-CCNC: 29 U/L (ref 13–56)
ANION GAP SERPL CALC-SCNC: 6 MMOL/L (ref 3–18)
APPEARANCE UR: CLEAR
AST SERPL-CCNC: 22 U/L (ref 10–38)
BASOPHILS # BLD: 0 K/UL (ref 0–0.1)
BASOPHILS NFR BLD: 1 % (ref 0–2)
BILIRUB SERPL-MCNC: 0.6 MG/DL (ref 0.2–1)
BILIRUB UR QL: NEGATIVE
BUN SERPL-MCNC: 22 MG/DL (ref 7–18)
BUN/CREAT SERPL: 26 (ref 12–20)
CALCIUM SERPL-MCNC: 9.7 MG/DL (ref 8.5–10.1)
CHLORIDE SERPL-SCNC: 105 MMOL/L (ref 100–111)
CHOLEST SERPL-MCNC: 168 MG/DL
CO2 SERPL-SCNC: 28 MMOL/L (ref 21–32)
COLOR UR: YELLOW
CREAT SERPL-MCNC: 0.86 MG/DL (ref 0.6–1.3)
DIFFERENTIAL METHOD BLD: ABNORMAL
EOSINOPHIL # BLD: 0.1 K/UL (ref 0–0.4)
EOSINOPHIL NFR BLD: 2 % (ref 0–5)
ERYTHROCYTE [DISTWIDTH] IN BLOOD BY AUTOMATED COUNT: 11.9 % (ref 11.6–14.5)
GLOBULIN SER CALC-MCNC: 3.1 G/DL (ref 2–4)
GLUCOSE SERPL-MCNC: 57 MG/DL (ref 74–99)
GLUCOSE UR STRIP.AUTO-MCNC: NEGATIVE MG/DL
HCT VFR BLD AUTO: 47.6 % (ref 35–45)
HDLC SERPL-MCNC: 83 MG/DL (ref 40–60)
HDLC SERPL: 2 {RATIO} (ref 0–5)
HGB BLD-MCNC: 15.4 G/DL (ref 12–16)
HGB UR QL STRIP: NEGATIVE
IMM GRANULOCYTES # BLD AUTO: 0 K/UL (ref 0–0.04)
IMM GRANULOCYTES NFR BLD AUTO: 0 % (ref 0–0.5)
KETONES UR QL STRIP.AUTO: NEGATIVE MG/DL
LDLC SERPL CALC-MCNC: 67.2 MG/DL (ref 0–100)
LEUKOCYTE ESTERASE UR QL STRIP.AUTO: NEGATIVE
LIPID PROFILE,FLP: ABNORMAL
LYMPHOCYTES # BLD: 1.7 K/UL (ref 0.9–3.6)
LYMPHOCYTES NFR BLD: 37 % (ref 21–52)
MCH RBC QN AUTO: 30.9 PG (ref 24–34)
MCHC RBC AUTO-ENTMCNC: 32.4 G/DL (ref 31–37)
MCV RBC AUTO: 95.6 FL (ref 78–100)
MONOCYTES # BLD: 0.5 K/UL (ref 0.05–1.2)
MONOCYTES NFR BLD: 10 % (ref 3–10)
NEUTS SEG # BLD: 2.3 K/UL (ref 1.8–8)
NEUTS SEG NFR BLD: 50 % (ref 40–73)
NITRITE UR QL STRIP.AUTO: NEGATIVE
NRBC # BLD: 0 K/UL (ref 0–0.01)
NRBC BLD-RTO: 0 PER 100 WBC
PH UR STRIP: 5.5 [PH] (ref 5–8)
PLATELET # BLD AUTO: 309 K/UL (ref 135–420)
PMV BLD AUTO: 9.5 FL (ref 9.2–11.8)
POTASSIUM SERPL-SCNC: 4.5 MMOL/L (ref 3.5–5.5)
PROT SERPL-MCNC: 7.4 G/DL (ref 6.4–8.2)
PROT UR STRIP-MCNC: NEGATIVE MG/DL
RBC # BLD AUTO: 4.98 M/UL (ref 4.2–5.3)
SODIUM SERPL-SCNC: 139 MMOL/L (ref 136–145)
SP GR UR REFRACTOMETRY: 1.01 (ref 1–1.03)
TRIGL SERPL-MCNC: 89 MG/DL (ref ?–150)
TSH SERPL DL<=0.05 MIU/L-ACNC: 1.69 UIU/ML (ref 0.36–3.74)
UROBILINOGEN UR QL STRIP.AUTO: 0.2 EU/DL (ref 0.2–1)
VLDLC SERPL CALC-MCNC: 17.8 MG/DL
WBC # BLD AUTO: 4.5 K/UL (ref 4.6–13.2)

## 2022-04-28 PROCEDURE — 81003 URINALYSIS AUTO W/O SCOPE: CPT

## 2022-04-28 PROCEDURE — 36415 COLL VENOUS BLD VENIPUNCTURE: CPT

## 2022-04-28 PROCEDURE — 80053 COMPREHEN METABOLIC PANEL: CPT

## 2022-04-28 PROCEDURE — 86803 HEPATITIS C AB TEST: CPT

## 2022-04-28 PROCEDURE — 80061 LIPID PANEL: CPT

## 2022-04-28 PROCEDURE — 85025 COMPLETE CBC W/AUTO DIFF WBC: CPT

## 2022-04-28 PROCEDURE — 82306 VITAMIN D 25 HYDROXY: CPT

## 2022-04-28 PROCEDURE — 84443 ASSAY THYROID STIM HORMONE: CPT

## 2022-04-29 LAB
HCV AB SER IA-ACNC: 0.07 INDEX
HCV AB SERPL QL IA: NEGATIVE
HCV COMMENT,HCGAC: NORMAL

## 2022-05-02 NOTE — PROGRESS NOTES
1. \"Have you been to the ER, urgent care clinic since your last visit? Hospitalized since your last visit? \" No    2. \"Have you seen or consulted any other health care providers outside of the [address] [place] [address] since your last visit? \" Yony Ornelas. 3. For patients aged 39-70: Has the patient had a colonoscopy / FIT/ Cologuard? Yes - no Care Gap present      If the patient is female:    4. For patients aged 41-77: Has the patient had a mammogram within the past 2 years? Yes - no Care Gap present      5. For patients aged 21-65: Has the patient had a pap smear?  Yes - no Care Gap present

## 2022-05-05 ENCOUNTER — OFFICE VISIT (OUTPATIENT)
Dept: INTERNAL MEDICINE CLINIC | Age: 62
End: 2022-05-05
Payer: COMMERCIAL

## 2022-05-05 ENCOUNTER — TELEPHONE (OUTPATIENT)
Dept: INTERNAL MEDICINE CLINIC | Age: 62
End: 2022-05-05

## 2022-05-05 VITALS
HEART RATE: 68 BPM | SYSTOLIC BLOOD PRESSURE: 106 MMHG | DIASTOLIC BLOOD PRESSURE: 62 MMHG | HEIGHT: 68 IN | RESPIRATION RATE: 16 BRPM | TEMPERATURE: 97.7 F | BODY MASS INDEX: 19.1 KG/M2 | OXYGEN SATURATION: 97 % | WEIGHT: 126 LBS

## 2022-05-05 DIAGNOSIS — M18.12 PRIMARY OSTEOARTHRITIS OF FIRST CARPOMETACARPAL JOINT OF LEFT HAND: ICD-10-CM

## 2022-05-05 DIAGNOSIS — Z86.006 HISTORY OF MELANOMA IN SITU: ICD-10-CM

## 2022-05-05 DIAGNOSIS — Z86.19 HISTORY OF INFECTION DUE TO HUMAN PAPILLOMA VIRUS (HPV): ICD-10-CM

## 2022-05-05 DIAGNOSIS — Z00.00 LABORATORY TESTS ORDERED AS PART OF A COMPLETE PHYSICAL EXAM (CPE): ICD-10-CM

## 2022-05-05 DIAGNOSIS — E55.9 VITAMIN D DEFICIENCY: ICD-10-CM

## 2022-05-05 DIAGNOSIS — F32.5 MAJOR DEPRESSIVE DISORDER WITH SINGLE EPISODE, IN FULL REMISSION (HCC): ICD-10-CM

## 2022-05-05 DIAGNOSIS — Z00.00 ENCOUNTER FOR ROUTINE HISTORY AND PHYSICAL EXAM IN FEMALE: Primary | ICD-10-CM

## 2022-05-05 DIAGNOSIS — E78.5 HYPERLIPIDEMIA, UNSPECIFIED HYPERLIPIDEMIA TYPE: ICD-10-CM

## 2022-05-05 DIAGNOSIS — L65.8 FEMALE PATTERN HAIR LOSS: ICD-10-CM

## 2022-05-05 PROBLEM — R63.4 WEIGHT LOSS, UNINTENTIONAL: Status: RESOLVED | Noted: 2021-05-10 | Resolved: 2022-05-05

## 2022-05-05 PROCEDURE — 99396 PREV VISIT EST AGE 40-64: CPT | Performed by: INTERNAL MEDICINE

## 2022-05-05 RX ORDER — ATORVASTATIN CALCIUM 20 MG/1
20 TABLET, FILM COATED ORAL DAILY
Qty: 90 TABLET | Refills: 3 | Status: SHIPPED | OUTPATIENT
Start: 2022-05-05

## 2022-05-05 NOTE — PATIENT INSTRUCTIONS
Heart-Healthy Diet: Care Instructions  Your Care Instructions     A heart-healthy diet has lots of vegetables, fruits, nuts, beans, and whole grains, and is low in salt. It limits foods that are high in saturated fat, such as meats, cheeses, and fried foods. It may be hard to change your diet, but even small changes can lower your risk of heart attack and heart disease. Follow-up care is a key part of your treatment and safety. Be sure to make and go to all appointments, and call your doctor if you are having problems. It's also a good idea to know your test results and keep a list of the medicines you take. How can you care for yourself at home? Watch your portions  · Learn what a serving is. A \"serving\" and a \"portion\" are not always the same thing. Make sure that you are not eating larger portions than are recommended. For example, a serving of pasta is ½ cup. A serving size of meat is 2 to 3 ounces. A 3-ounce serving is about the size of a deck of cards. Measure serving sizes until you are good at [place]" them. Keep in mind that restaurants often serve portions that are 2 or 3 times the size of one serving. · To keep your energy level up and keep you from feeling hungry, eat often but in smaller portions. · Eat only the number of calories you need to stay at a healthy weight. If you need to lose weight, eat fewer calories than your body burns (through exercise and other physical activity). Eat more fruits and vegetables  · Eat a variety of fruit and vegetables every day. Dark green, deep orange, red, or yellow fruits and vegetables are especially good for you. Examples include spinach, carrots, peaches, and berries. · Keep carrots, celery, and other veggies handy for snacks. Buy fruit that is in season and store it where you can see it so that you will be tempted to eat it. · Cook dishes that have a lot of veggies in them, such as stir-fries and soups.   Limit saturated and trans fat  · Read food labels, and try to avoid saturated and trans fats. They increase your risk of heart disease. · Use olive or canola oil when you cook. · Bake, broil, grill, or steam foods instead of frying them. · Choose lean meats instead of high-fat meats such as hot dogs and sausages. Cut off all visible fat when you prepare meat. · Eat fish, skinless poultry, and meat alternatives such as soy products instead of high-fat meats. Soy products, such as tofu, may be especially good for your heart. · Choose low-fat or fat-free milk and dairy products. Eat foods high in fiber  · Eat a variety of grain products every day. Include whole-grain foods that have lots of fiber and nutrients. Examples of whole-grain foods include oats, whole wheat bread, and brown rice. · Buy whole-grain breads and cereals, instead of white bread or pastries. Limit salt and sodium  · Limit how much salt and sodium you eat to help lower your blood pressure. · Taste food before you salt it. Add only a little salt when you think you need it. With time, your taste buds will adjust to less salt. · Eat fewer snack items, fast foods, and other high-salt, processed foods. Check food labels for the amount of sodium in packaged foods. · Choose low-sodium versions of canned goods (such as soups, vegetables, and beans). Limit sugar  · Limit drinks and foods with added sugar. These include candy, desserts, and soda pop. Limit alcohol  · Limit alcohol to no more than 2 drinks a day for men and 1 drink a day for women. Too much alcohol can cause health problems. When should you call for help? Watch closely for changes in your health, and be sure to contact your doctor if:    · You would like help planning heart-healthy meals. Where can you learn more? Go to http://www.[employer].com/  Enter V137 in the search box to learn more about \"Heart-Healthy Diet: Care Instructions. \"  Current as of: August 22, 2019               Content Version: 12.6  © 3589-7782 [employer]. Care instructions adapted under license by [employer] (which disclaims liability or warranty for this information). If you have questions about a medical condition or this instruction, always ask your healthcare professional. [address] any warranty or liability for your use of this information.

## 2022-05-08 PROBLEM — M18.12 PRIMARY OSTEOARTHRITIS OF FIRST CARPOMETACARPAL JOINT OF LEFT HAND: Status: ACTIVE | Noted: 2022-05-08

## 2022-05-08 PROBLEM — M19.042 PRIMARY OSTEOARTHRITIS OF LEFT HAND: Status: ACTIVE | Noted: 2022-05-08

## 2022-05-08 RX ORDER — MELATONIN
1000 DAILY
COMMUNITY

## 2022-05-09 NOTE — PROGRESS NOTES
AndHPI:   Stephanie Brown is a 57y.o. year old female who presents today for a physical exam. She has a history of hyperlipidemia, insomnia, and androgenic hair loss. She has completed the Moderna COVID 19 vaccine series and received two Moderna booster doses. She reports that she is doing reasonably well. She states that she was diagnosed with a melanoma in situ in 3/2021 and is being followed closely by [facility] dermatology. She states that she has multiple other moles that are being recommended to be removed given atypia. She also reports that she had an abnormal Pap smear and was HPV positive at her well woman exam in 3/2022. She reports that she underwent colposcopy and recommendation is to undergo a repeat Pap smear in 6 months. She reports that she has now resumed exercising by walking and doing Pilates. She is otherwise without new complaints and overall feeling well. Summary of prior hospitalizations and medical history:  She has a history of hyperlipidemia, treated with moderate intensity dose atorvastatin. She remains active, and denies any chest pain, shortness of breath at rest or with exertion, palpitations, lightheadedness, or edema. She has a history of migraine headaches, mainly characterized by right sided pain, but states that these have diminished significantly since completing menopause. She denies any aura or visual changes associated with headaches, and had been using naproxen as needed with good response. She has a history of androgenic hair loss, and is currently being treated with spironolactone and dutasteride. She is under the care of Dr. Lorena Mon. She has a family history of colon cancer in her father and reports having regular colonoscopies. She underwent a screening colonoscopy in 8/2019 by Dr. Mervat Mathew which was normal.  Follow-up recommended in 5 years. She has regular well women exams with Dr. Marc Bernard and eye exams with Dr. Kwabena Blank.      In 5/2021, she complained of symptoms of depression since 10/2020, characterized by sadness, decreased appetite, and decreased weight, which she attributed to isolation related to the pandemic. She was started on Zoloft 25 mg daily with improvement. However, reports no longer taking since symptoms have improved. Past Medical History:   Diagnosis Date    Female pattern hair loss 5/4/2021    Hx of migraine headaches     resolved after menopause    Hyperlipidemia 5/4/2021    Vitamin D deficiency 5/4/2021     No past surgical history on file. Current Outpatient Medications   Medication Sig    cholecalciferol (VITAMIN D3) (1000 Units /25 mcg) tablet Take 1,000 Units by mouth daily.  atorvastatin (LIPITOR) 20 mg tablet Take 1 Tablet by mouth daily.  eszopiclone (LUNESTA) 2 mg tablet TAKE 1 TABLET BY MOUTH ONCE DAILY AT BEDTIME    spironolactone (ALDACTONE) 100 mg tablet TAKE 1 TABLET BY MOUTH ONCE DAILY    dutasteride (AVODART) 0.5 mg capsule TAKE 1 CAPSULE BY MOUTH ONCE DAILY    naproxen sodium (ANAPROX) 550 mg tablet Take 550 mg by mouth. No current facility-administered medications for this visit. Allergies and Intolerances:   Not on File     Family History: She has no family history of breast cancer. She does have a FH of hyperlipidemia and hypertension. Family History   Problem Relation Age of Onset    Dementia Mother     Colon Cancer Father      Social History:   She  reports that she has never smoked. She has never used smokeless tobacco. She is a . She has two adult daughters. 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 She never smoked and will drink alcohol occasionally, approximately 2 glasses of wine per month.      Social History     Substance and Sexual Activity   Alcohol Use Yes       Immunization History:  Immunization History   Administered Date(s) Administered    COVID-19, Moderna Booster, PF, 0.25mL Dose 11/03/2021, 04/05/2022    COVID-19, Moderna, Primary or Immunocompromised Series, MRNA, PF, 100mcg/0.5mL 01/14/2021, 02/11/2021    Influenza Vaccine 09/05/2019, 08/28/2021    Influenza Vaccine (>6 mo Afluria QUAD Vial 18902 (0.25 mL) / 33915 (0.5 mL)) 08/11/2020    Tdap 01/01/2018, 10/30/2018    Zoster Recombinant 05/11/2021, 07/20/2021       Review of Systems:   As above included in HPI. Otherwise 11 point review of systems negative including constitutional, skin, HENT, eyes, respiratory, cardiovascular, gastrointestinal, genitourinary, musculoskeletal, endocrine, hematologic, allergy, and neurologic. Physical:   Visit Vitals  /62 (BP 1 Location: Left arm, BP Patient Position: Sitting)   Pulse 68   Temp 97.7 °F (36.5 °C) (Temporal)   Resp 16   Ht 5' 8.3\" (1.735 m)   Wt 126 lb (57.2 kg)   SpO2 97%   BMI 18.99 kg/m²       Exam:   Patient appears in no apparent distress. Affect is appropriate. HEENT --Anicteric sclerae, tympanic membranes normal,  ear canals normal.  PERRL, EOMI, conjunctiva and lids normal.  No cervical lymphadenopathy. No thyromegaly, JVD, or bruits. Carotid pulses 2+ with normal upstroke. Lungs --Clear to auscultation. No wheezing or rales. Heart --Regular rate and rhythm, no murmurs, rubs, gallops, or clicks. Abdomen -- Soft and nontender, no hepatosplenomegaly or masses. Extremities -- Without cyanosis, clubbing, edema. Normal looking digits, ROM intact  Neuro -- CN 2-12 intact, strength 5/5 with intact soft touch in all extremities  Derm  no obvious abnormalities noted, no rash        Review of Data:  Labs:  No visits with results within 2 Day(s) from this visit.    Latest known visit with results is:   Hospital Outpatient Visit on 04/28/2022   Component Date Value Ref Range Status    WBC 04/28/2022 4.5* 4.6 - 13.2 K/uL Final    RBC 04/28/2022 4.98  4.20 - 5.30 M/uL Final    HGB 04/28/2022 15.4  12.0 - 16.0 g/dL Final    HCT 04/28/2022 47.6* 35.0 - 45.0 % Final    MCV 04/28/2022 95.6  78.0 - 100.0 FL Final    MCH 04/28/2022 30.9  24.0 - 34.0 PG Final    MCHC 04/28/2022 32.4  31.0 - 37.0 g/dL Final    RDW 04/28/2022 11.9  11.6 - 14.5 % Final    PLATELET 74/88/9828 956  135 - 420 K/uL Final    MPV 04/28/2022 9.5  9.2 - 11.8 FL Final    NRBC 04/28/2022 0.0  0  WBC Final    ABSOLUTE NRBC 04/28/2022 0.00  0.00 - 0.01 K/uL Final    NEUTROPHILS 04/28/2022 50  40 - 73 % Final    LYMPHOCYTES 04/28/2022 37  21 - 52 % Final    MONOCYTES 04/28/2022 10  3 - 10 % Final    EOSINOPHILS 04/28/2022 2  0 - 5 % Final    BASOPHILS 04/28/2022 1  0 - 2 % Final    IMMATURE GRANULOCYTES 04/28/2022 0  0.0 - 0.5 % Final    ABS. NEUTROPHILS 04/28/2022 2.3  1.8 - 8.0 K/UL Final    ABS. LYMPHOCYTES 04/28/2022 1.7  0.9 - 3.6 K/UL Final    ABS. MONOCYTES 04/28/2022 0.5  0.05 - 1.2 K/UL Final    ABS. EOSINOPHILS 04/28/2022 0.1  0.0 - 0.4 K/UL Final    ABS. BASOPHILS 04/28/2022 0.0  0.0 - 0.1 K/UL Final    ABS. IMM.  GRANS. 04/28/2022 0.0  0.00 - 0.04 K/UL Final    DF 04/28/2022 AUTOMATED    Final    LIPID PROFILE 04/28/2022        Final    Cholesterol, total 04/28/2022 168  <200 MG/DL Final    Triglyceride 04/28/2022 89  <150 MG/DL Final    HDL Cholesterol 04/28/2022 83* 40 - 60 MG/DL Final    LDL, calculated 04/28/2022 67.2  0 - 100 MG/DL Final    VLDL, calculated 04/28/2022 17.8  MG/DL Final    CHOL/HDL Ratio 04/28/2022 2.0  0 - 5.0   Final    Sodium 04/28/2022 139  136 - 145 mmol/L Final    Potassium 04/28/2022 4.5  3.5 - 5.5 mmol/L Final    Chloride 04/28/2022 105  100 - 111 mmol/L Final    CO2 04/28/2022 28  21 - 32 mmol/L Final    Anion gap 04/28/2022 6  3.0 - 18 mmol/L Final    Glucose 04/28/2022 57* 74 - 99 mg/dL Final    BUN 04/28/2022 22* 7.0 - 18 MG/DL Final    Creatinine 04/28/2022 0.86  0.6 - 1.3 MG/DL Final    BUN/Creatinine ratio 04/28/2022 26* 12 - 20   Final    GFR est AA 04/28/2022 >60  >60 ml/min/1.73m2 Final    GFR est non-AA 04/28/2022 >60  >60 ml/min/1.73m2 Final    Calcium 04/28/2022 9.7  8.5 - 10.1 MG/DL Final    Bilirubin, total 04/28/2022 0.6  0.2 - 1.0 MG/DL Final    ALT (SGPT) 04/28/2022 29  13 - 56 U/L Final    AST (SGOT) 04/28/2022 22  10 - 38 U/L Final    Alk. phosphatase 04/28/2022 83  45 - 117 U/L Final    Protein, total 04/28/2022 7.4  6.4 - 8.2 g/dL Final    Albumin 04/28/2022 4.3  3.4 - 5.0 g/dL Final    Globulin 04/28/2022 3.1  2.0 - 4.0 g/dL Final    A-G Ratio 04/28/2022 1.4  0.8 - 1.7   Final    TSH 04/28/2022 1.69  0.36 - 3.74 uIU/mL Final    Vitamin D 25-Hydroxy 04/28/2022 42.4  30 - 100 ng/mL Final    Hepatitis C virus Ab 04/28/2022 0.07  <0.80 Index Final    Hep C virus Ab Interp. 04/28/2022 Negative  NEG   Final    Hep C  virus Ab comment 04/28/2022        Final    Color 04/28/2022 YELLOW    Final    Appearance 04/28/2022 CLEAR    Final    Specific gravity 04/28/2022 1.013  1.005 - 1.030   Final    pH (UA) 04/28/2022 5.5  5.0 - 8.0   Final    Protein 04/28/2022 Negative  NEG mg/dL Final    Glucose 04/28/2022 Negative  NEG mg/dL Final    Ketone 04/28/2022 Negative  NEG mg/dL Final    Bilirubin 04/28/2022 Negative  NEG   Final    Blood 04/28/2022 Negative  NEG   Final    Urobilinogen 04/28/2022 0.2  0.2 - 1.0 EU/dL Final    Nitrites 04/28/2022 Negative  NEG   Final    Leukocyte Esterase 04/28/2022 Negative  NEG   Final         Health Maintenance:  Screening:    Mammogram: negative (1/2022) [facility]   PAP smear: well woman exams with Dr. Katiana Almonte (last 3/2022-abnormal Pap/ HPV+)   Colorectal: colonoscopy (8/2019) normal.  Dr. Bell Herrera. Due 8/2024.    Depression: mild symptoms   DM (HbA1c/FPG): FPG 57 (4/2022)   Hepatitis C: negative (4/2022)   Falls: none   DEXA: unknown   Glaucoma: regular eye exams with Dr. Jesse Estrella (last 3/2021)   Smoking: none   Vitamin D: 42.4 (4/2022)   Medicare Wellness: n/a        Impression:  Patient Active Problem List   Diagnosis Code    Hyperlipidemia E78.5    Female pattern hair loss L65.8    Insomnia G47.00    Depression F32. A    Vitamin D deficiency E55.9    History of melanoma in situ Z86.006    History of infection due to human papilloma virus (HPV) Z86.19    Primary osteoarthritis of first carpometacarpal joint of left hand M18.12       Plan:  1. Hyperlipidemia. On moderate intensity dose atorvastatin with LDL 67 and HDL 83, indicative of excellent control. Continue to follow. 2. Androgenic hair loss. TSH, DHEA, and testosterone normal (5/2021). Under the care of dermatologist, Dr. Trini Fajardo, and being treated with spironolactone and dutasteride. Appears to be remaining stable. 3. Depression. In 5/2021, reported symptoms of depression present for at least 6 months and associated with nearly 10 pound weight loss. No anxiety or suicidal thoughts. Started on Zoloft 25 mg daily and found to be helpful. Weaned from taking it after approximately 3 months of treatment since symptoms improved. Still with some mild symptoms but not wishing to resume. Will continue to monitor. 4. Insomnia. Chronic problem. Using Lunesta once or twice per month with good response. Likely was exacerbated by issues with depression but seems to have improved. Will continue to monitor. 5. History of migraine headaches. Reports usually right sided and would respond to naproxen. Significantly improved since completion of menopause. Will continue to monitor. 6. Vitamin D deficiency. Vitamin D level very low in 5/2021 and prescribed ergocalciferol 50,000 U weekly for 24 weeks and advised to continue on maintenance dose 2000 U supplement after completion. Repeat today normalized at 42.4 and advised to continue on maintenance dose supplement  7. Osteoarthritis. Described left wrist pain without evidence of active synovitis on exam.  Underwent left wrist x-ray (9/29/2021) which showed evidence of mild first CMC osteoarthritis. Advised to minimize NSAID use and may apply topical Voltaren gel.   Reports overall improvement today.  8.  Abnormal Pap smear with HPV positive. Diagnosed in 3/2022 and underwent colposcopy. Reports 6-month follow-up recommended. Screening labs for STI negative. 9.  History of melanoma in situ. Being followed by [facility] dermatology and scheduled for removal of multiple skin lesions showing atypia. 10.  Unintentional weight loss. Patient reported baseline weight at 132 pounds. Decreased approximately 10 pounds since 11/2020 due to depression and decreased appetite. Started on Zoloft at last visit with good response and weaned from taking it. Weight overall improved and remaining stable. Encouraged to continue to maintain protein and caloric intake. Will continue to monitor. 11. Health maintenance. Completed Moderna COVID 19 vaccine series and received two Moderna booster doses. Completed 2/2 doses of Shingrix vaccine. Other immunizations up to date. Mammogram and well women exams up to date. Colonoscopy completed in 8/2019 with follow-up due in 5 years. Continue regular eye exams with Dr. Marian Tadeo. Wishing to defer baseline bone density study to age 70 due to cost. Vitamin D level normalized today. Reports has now resumed exercising by walking and doing Pilates. Patient understands recommendations and agrees with plan. Follow-up in 1 year.

## 2022-05-24 ENCOUNTER — PATIENT MESSAGE (OUTPATIENT)
Dept: INTERNAL MEDICINE CLINIC | Age: 62
End: 2022-05-24

## 2022-05-25 RX ORDER — SERTRALINE HYDROCHLORIDE 25 MG/1
25 TABLET, FILM COATED ORAL DAILY
Qty: 90 TABLET | Refills: 0 | Status: SHIPPED | OUTPATIENT
Start: 2022-05-25 | End: 2022-07-04

## 2022-05-25 NOTE — TELEPHONE ENCOUNTER
Patient is requesting to go back on this medication    Last Visit: 5/5/22 with MD Antonina Yu  Next Appointment: 5/11/23 with MD Oliveira  Previous Refill Encounter(s): 5/4/21 #90 with 2 refills    Requested Prescriptions     Pending Prescriptions Disp Refills    sertraline (ZOLOFT) 25 mg tablet 90 Tablet 3     Sig: Take 1 Tablet by mouth daily.

## 2022-05-25 NOTE — TELEPHONE ENCOUNTER
From: Pattie Orlando  To: Mauro Wren MD  Sent: 5/24/2022 5:13 PM EDT  Subject: Zoloft    Would like to go back on Zoloft.

## 2023-02-03 DIAGNOSIS — Z00.00 LABORATORY TESTS ORDERED AS PART OF A COMPLETE PHYSICAL EXAM (CPE): ICD-10-CM

## 2023-02-03 DIAGNOSIS — E78.5 HYPERLIPIDEMIA, UNSPECIFIED HYPERLIPIDEMIA TYPE: Primary | ICD-10-CM

## 2023-02-04 DIAGNOSIS — E78.5 HYPERLIPIDEMIA, UNSPECIFIED HYPERLIPIDEMIA TYPE: Primary | ICD-10-CM

## 2023-02-04 DIAGNOSIS — Z00.00 LABORATORY TESTS ORDERED AS PART OF A COMPLETE PHYSICAL EXAM (CPE): ICD-10-CM

## 2023-02-06 DIAGNOSIS — Z00.00 LABORATORY TESTS ORDERED AS PART OF A COMPLETE PHYSICAL EXAM (CPE): ICD-10-CM

## 2023-02-06 DIAGNOSIS — E55.9 VITAMIN D DEFICIENCY: Primary | ICD-10-CM

## 2023-05-02 ENCOUNTER — PATIENT MESSAGE (OUTPATIENT)
Age: 63
End: 2023-05-02

## 2023-05-02 ENCOUNTER — HOSPITAL ENCOUNTER (OUTPATIENT)
Facility: HOSPITAL | Age: 63
Setting detail: SPECIMEN
Discharge: HOME | End: 2023-05-05
Payer: COMMERCIAL

## 2023-05-02 DIAGNOSIS — E55.9 VITAMIN D DEFICIENCY: ICD-10-CM

## 2023-05-02 DIAGNOSIS — E78.5 HYPERLIPIDEMIA, UNSPECIFIED HYPERLIPIDEMIA TYPE: ICD-10-CM

## 2023-05-02 DIAGNOSIS — Z00.00 LABORATORY TESTS ORDERED AS PART OF A COMPLETE PHYSICAL EXAM (CPE): ICD-10-CM

## 2023-05-02 LAB
25(OH)D3 SERPL-MCNC: 43.7 NG/ML (ref 30–100)
ALBUMIN SERPL-MCNC: 3.9 G/DL (ref 3.4–5)
ALBUMIN/GLOB SERPL: 1.3 (ref 0.8–1.7)
ALP SERPL-CCNC: 80 U/L (ref 45–117)
ALT SERPL-CCNC: 48 U/L (ref 13–56)
ANION GAP SERPL CALC-SCNC: 5 MMOL/L (ref 3–18)
APPEARANCE UR: CLEAR
AST SERPL-CCNC: 28 U/L (ref 10–38)
BACTERIA URNS QL MICRO: NEGATIVE /HPF
BASOPHILS # BLD: 0 K/UL (ref 0–0.1)
BASOPHILS NFR BLD: 0 % (ref 0–2)
BILIRUB SERPL-MCNC: 0.4 MG/DL (ref 0.2–1)
BILIRUB UR QL: NEGATIVE
BUN SERPL-MCNC: 23 MG/DL (ref 7–18)
BUN/CREAT SERPL: 32 (ref 12–20)
CALCIUM SERPL-MCNC: 9.4 MG/DL (ref 8.5–10.1)
CHLORIDE SERPL-SCNC: 106 MMOL/L (ref 100–111)
CHOLEST SERPL-MCNC: 179 MG/DL
CO2 SERPL-SCNC: 29 MMOL/L (ref 21–32)
COLOR UR: YELLOW
CREAT SERPL-MCNC: 0.72 MG/DL (ref 0.6–1.3)
DIFFERENTIAL METHOD BLD: NORMAL
EOSINOPHIL # BLD: 0.2 K/UL (ref 0–0.4)
EOSINOPHIL NFR BLD: 2 % (ref 0–5)
EPITH CASTS URNS QL MICRO: NORMAL /LPF (ref 0–5)
ERYTHROCYTE [DISTWIDTH] IN BLOOD BY AUTOMATED COUNT: 11.9 % (ref 11.6–14.5)
GLOBULIN SER CALC-MCNC: 3.1 G/DL (ref 2–4)
GLUCOSE SERPL-MCNC: 100 MG/DL (ref 74–99)
GLUCOSE UR STRIP.AUTO-MCNC: NEGATIVE MG/DL
HCT VFR BLD AUTO: 41.7 % (ref 35–45)
HDLC SERPL-MCNC: 85 MG/DL (ref 40–60)
HDLC SERPL: 2.1 (ref 0–5)
HGB BLD-MCNC: 13.6 G/DL (ref 12–16)
HGB UR QL STRIP: NEGATIVE
IMM GRANULOCYTES # BLD AUTO: 0 K/UL (ref 0–0.04)
IMM GRANULOCYTES NFR BLD AUTO: 0 % (ref 0–0.5)
KETONES UR QL STRIP.AUTO: NEGATIVE MG/DL
LDLC SERPL CALC-MCNC: 52.4 MG/DL (ref 0–100)
LEUKOCYTE ESTERASE UR QL STRIP.AUTO: NEGATIVE
LIPID PANEL: ABNORMAL
LYMPHOCYTES # BLD: 1.9 K/UL (ref 0.9–3.6)
LYMPHOCYTES NFR BLD: 28 % (ref 21–52)
MAGNESIUM SERPL-MCNC: 2 MG/DL (ref 1.6–2.6)
MCH RBC QN AUTO: 30.9 PG (ref 24–34)
MCHC RBC AUTO-ENTMCNC: 32.6 G/DL (ref 31–37)
MCV RBC AUTO: 94.8 FL (ref 78–100)
MONOCYTES # BLD: 0.4 K/UL (ref 0.05–1.2)
MONOCYTES NFR BLD: 6 % (ref 3–10)
NEUTS SEG # BLD: 4.4 K/UL (ref 1.8–8)
NEUTS SEG NFR BLD: 63 % (ref 40–73)
NITRITE UR QL STRIP.AUTO: NEGATIVE
NRBC # BLD: 0 K/UL (ref 0–0.01)
NRBC BLD-RTO: 0 PER 100 WBC
PH UR STRIP: 5.5 (ref 5–8)
PLATELET # BLD AUTO: 342 K/UL (ref 135–420)
PMV BLD AUTO: 9.6 FL (ref 9.2–11.8)
POTASSIUM SERPL-SCNC: 4.1 MMOL/L (ref 3.5–5.5)
PROT SERPL-MCNC: 7 G/DL (ref 6.4–8.2)
PROT UR STRIP-MCNC: NEGATIVE MG/DL
RBC # BLD AUTO: 4.4 M/UL (ref 4.2–5.3)
RBC #/AREA URNS HPF: NEGATIVE /HPF (ref 0–5)
SODIUM SERPL-SCNC: 140 MMOL/L (ref 136–145)
SP GR UR REFRACTOMETRY: 1.01 (ref 1–1.03)
TRIGL SERPL-MCNC: 208 MG/DL
UROBILINOGEN UR QL STRIP.AUTO: 0.2 EU/DL (ref 0.2–1)
VLDLC SERPL CALC-MCNC: 41.6 MG/DL
WBC # BLD AUTO: 7 K/UL (ref 4.6–13.2)
WBC URNS QL MICRO: NEGATIVE /HPF (ref 0–4)

## 2023-05-02 PROCEDURE — 80061 LIPID PANEL: CPT

## 2023-05-02 PROCEDURE — 85025 COMPLETE CBC W/AUTO DIFF WBC: CPT

## 2023-05-02 PROCEDURE — 80053 COMPREHEN METABOLIC PANEL: CPT

## 2023-05-02 PROCEDURE — 83735 ASSAY OF MAGNESIUM: CPT

## 2023-05-02 PROCEDURE — 36415 COLL VENOUS BLD VENIPUNCTURE: CPT

## 2023-05-02 PROCEDURE — 81001 URINALYSIS AUTO W/SCOPE: CPT

## 2023-05-02 PROCEDURE — 82306 VITAMIN D 25 HYDROXY: CPT

## 2023-05-02 NOTE — TELEPHONE ENCOUNTER
Please let her know that it would be a baseline bone density study to screen for osteopenia and the diagnosis code is: postmenopausal (Z78.0). Thanks.

## 2023-05-16 ENCOUNTER — OFFICE VISIT (OUTPATIENT)
Age: 63
End: 2023-05-16
Payer: COMMERCIAL

## 2023-05-16 VITALS
TEMPERATURE: 97.2 F | RESPIRATION RATE: 16 BRPM | BODY MASS INDEX: 20.1 KG/M2 | HEART RATE: 63 BPM | SYSTOLIC BLOOD PRESSURE: 112 MMHG | WEIGHT: 132.6 LBS | DIASTOLIC BLOOD PRESSURE: 70 MMHG | HEIGHT: 68 IN | OXYGEN SATURATION: 98 %

## 2023-05-16 DIAGNOSIS — M72.0 DUPUYTREN'S CONTRACTURE OF LEFT HAND: ICD-10-CM

## 2023-05-16 DIAGNOSIS — Z00.00 LABORATORY TESTS ORDERED AS PART OF A COMPLETE PHYSICAL EXAM (CPE): ICD-10-CM

## 2023-05-16 DIAGNOSIS — E78.00 PURE HYPERCHOLESTEROLEMIA: ICD-10-CM

## 2023-05-16 DIAGNOSIS — M18.12 PRIMARY OSTEOARTHRITIS OF FIRST CARPOMETACARPAL JOINT OF LEFT HAND: ICD-10-CM

## 2023-05-16 DIAGNOSIS — Z00.00 ENCOUNTER FOR ROUTINE HISTORY AND PHYSICAL EXAM IN FEMALE: Primary | ICD-10-CM

## 2023-05-16 DIAGNOSIS — G47.00 INSOMNIA, UNSPECIFIED TYPE: ICD-10-CM

## 2023-05-16 DIAGNOSIS — E55.9 VITAMIN D DEFICIENCY: ICD-10-CM

## 2023-05-16 DIAGNOSIS — L65.8 FEMALE PATTERN HAIR LOSS: ICD-10-CM

## 2023-05-16 PROCEDURE — 99397 PER PM REEVAL EST PAT 65+ YR: CPT | Performed by: INTERNAL MEDICINE

## 2023-05-16 SDOH — ECONOMIC STABILITY: HOUSING INSECURITY
IN THE LAST 12 MONTHS, WAS THERE A TIME WHEN YOU DID NOT HAVE A STEADY PLACE TO SLEEP OR SLEPT IN A SHELTER (INCLUDING NOW)?: NO

## 2023-05-16 SDOH — ECONOMIC STABILITY: INCOME INSECURITY: HOW HARD IS IT FOR YOU TO PAY FOR THE VERY BASICS LIKE FOOD, HOUSING, MEDICAL CARE, AND HEATING?: NOT HARD AT ALL

## 2023-05-16 SDOH — ECONOMIC STABILITY: FOOD INSECURITY: WITHIN THE PAST 12 MONTHS, THE FOOD YOU BOUGHT JUST DIDN'T LAST AND YOU DIDN'T HAVE MONEY TO GET MORE.: NEVER TRUE

## 2023-05-16 SDOH — ECONOMIC STABILITY: FOOD INSECURITY: WITHIN THE PAST 12 MONTHS, YOU WORRIED THAT YOUR FOOD WOULD RUN OUT BEFORE YOU GOT MONEY TO BUY MORE.: NEVER TRUE

## 2023-05-16 ASSESSMENT — PATIENT HEALTH QUESTIONNAIRE - PHQ9
2. FEELING DOWN, DEPRESSED OR HOPELESS: 0
SUM OF ALL RESPONSES TO PHQ9 QUESTIONS 1 & 2: 0
1. LITTLE INTEREST OR PLEASURE IN DOING THINGS: 0
SUM OF ALL RESPONSES TO PHQ QUESTIONS 1-9: 0

## 2023-05-16 NOTE — PATIENT INSTRUCTIONS
Heart-Healthy Diet: Care Instructions  Your Care Instructions     A heart-healthy diet has lots of vegetables, fruits, nuts, beans, and whole grains, and is low in salt. It limits foods that are high in saturated fat, such as meats, cheeses, and fried foods. It may be hard to change your diet, but even small changes can lower your risk of heart attack and heart disease. Follow-up care is a key part of your treatment and safety. Be sure to make and go to all appointments, and call your doctor if you are having problems. It's also a good idea to know your test results and keep a list of the medicines you take. How can you care for yourself at home? Watch your portions  Learn what a serving is. A \"serving\" and a \"portion\" are not always the same thing. Make sure that you are not eating larger portions than are recommended. For example, a serving of pasta is ½ cup. A serving size of meat is 2 to 3 ounces. A 3-ounce serving is about the size of a deck of cards. Measure serving sizes until you are good at [place]" them. Keep in mind that restaurants often serve portions that are 2 or 3 times the size of one serving. To keep your energy level up and keep you from feeling hungry, eat often but in smaller portions. Eat only the number of calories you need to stay at a healthy weight. If you need to lose weight, eat fewer calories than your body burns (through exercise and other physical activity). Eat more fruits and vegetables  Eat a variety of fruit and vegetables every day. Dark green, deep orange, red, or yellow fruits and vegetables are especially good for you. Examples include spinach, carrots, peaches, and berries. Keep carrots, celery, and other veggies handy for snacks. Buy fruit that is in season and store it where you can see it so that you will be tempted to eat it. Cook dishes that have a lot of veggies in them, such as stir-fries and soups.   Limit saturated and trans fat  Read food labels, and

## 2023-05-16 NOTE — PROGRESS NOTES
[facility] presents today for   Chief Complaint   Patient presents with    Annual Exam                 1. \"Have you been to the ER, urgent care clinic since your last visit? Hospitalized since your last visit? \" no    2. \"Have you seen or consulted any other health care providers outside of the [address] [place] [address] since your last visit? \" no     3. For patients aged 39-70: Has the patient had a colonoscopy / FIT/ Cologuard? Yes - no Care Gap present      If the patient is female:    4. For patients aged 41-77: Has the patient had a mammogram within the past 2 years? Yes - Care Gap present. Rooming MA/LPN to request most recent results      5. For patients aged 21-65: Has the patient had a pap smear?  Yes - no Care Gap present

## 2023-05-20 PROBLEM — M72.0 DUPUYTREN'S CONTRACTURE OF LEFT HAND: Status: ACTIVE | Noted: 2023-05-20

## 2023-05-23 ENCOUNTER — TELEPHONE (OUTPATIENT)
Age: 63
End: 2023-05-23

## 2023-05-23 RX ORDER — NAPROXEN SODIUM 550 MG/1
550 TABLET ORAL 2 TIMES DAILY PRN
Qty: 60 TABLET | Refills: 1 | Status: SHIPPED | OUTPATIENT
Start: 2023-05-23

## 2023-05-23 NOTE — TELEPHONE ENCOUNTER
Requested refill:    - naproxen sodium (ANAPROX) 550 MG tablet    Pharmacy: [facility]

## 2023-06-21 RX ORDER — ATORVASTATIN CALCIUM 20 MG/1
TABLET, FILM COATED ORAL
Qty: 90 TABLET | Refills: 3 | Status: SHIPPED | OUTPATIENT
Start: 2023-06-21

## 2023-07-12 RX ORDER — NAPROXEN SODIUM 550 MG/1
TABLET ORAL
Qty: 60 TABLET | Refills: 1 | Status: SHIPPED | OUTPATIENT
Start: 2023-07-12

## 2023-07-12 NOTE — TELEPHONE ENCOUNTER
Previous refill per chart    naproxen sodium (ANAPROX) 550 MG tablet 60 tablet 1 5/23/2023     Sig - Route:  Take 1 tablet by mouth 2 times daily as needed for Pain Take with meal. - Oral    Sent to pharmacy as: Naproxen Sodium 550 MG Oral Tablet (ANAPROX)    E-Prescribing Status: Receipt confirmed by pharmacy (5/23/2023  5:50 PM EDT)

## 2023-09-23 ENCOUNTER — PATIENT MESSAGE (OUTPATIENT)
Age: 63
End: 2023-09-23

## 2023-09-23 DIAGNOSIS — M79.645 CHRONIC PAIN OF LEFT THUMB: Primary | ICD-10-CM

## 2023-09-23 DIAGNOSIS — G89.29 CHRONIC PAIN OF LEFT THUMB: Primary | ICD-10-CM

## 2023-09-23 DIAGNOSIS — M72.0 DUPUYTREN'S CONTRACTURE OF LEFT HAND: ICD-10-CM

## 2023-09-23 DIAGNOSIS — M19.042 PRIMARY OSTEOARTHRITIS OF LEFT HAND: ICD-10-CM

## 2023-11-13 RX ORDER — NAPROXEN SODIUM 550 MG/1
TABLET ORAL
Qty: 60 TABLET | Refills: 0 | Status: SHIPPED | OUTPATIENT
Start: 2023-11-13

## 2023-11-28 SDOH — HEALTH STABILITY: PHYSICAL HEALTH: ON AVERAGE, HOW MANY MINUTES DO YOU ENGAGE IN EXERCISE AT THIS LEVEL?: 60 MIN

## 2023-11-28 SDOH — HEALTH STABILITY: PHYSICAL HEALTH: ON AVERAGE, HOW MANY DAYS PER WEEK DO YOU ENGAGE IN MODERATE TO STRENUOUS EXERCISE (LIKE A BRISK WALK)?: 2 DAYS

## 2023-11-28 ASSESSMENT — SOCIAL DETERMINANTS OF HEALTH (SDOH)
WITHIN THE LAST YEAR, HAVE YOU BEEN KICKED, HIT, SLAPPED, OR OTHERWISE PHYSICALLY HURT BY YOUR PARTNER OR EX-PARTNER?: NO
WITHIN THE LAST YEAR, HAVE YOU BEEN AFRAID OF YOUR PARTNER OR EX-PARTNER?: NO
WITHIN THE LAST YEAR, HAVE YOU BEEN HUMILIATED OR EMOTIONALLY ABUSED IN OTHER WAYS BY YOUR PARTNER OR EX-PARTNER?: NO
WITHIN THE LAST YEAR, HAVE TO BEEN RAPED OR FORCED TO HAVE ANY KIND OF SEXUAL ACTIVITY BY YOUR PARTNER OR EX-PARTNER?: NO

## 2023-12-01 ENCOUNTER — OFFICE VISIT (OUTPATIENT)
Age: 63
End: 2023-12-01

## 2023-12-01 ENCOUNTER — TELEPHONE (OUTPATIENT)
Age: 63
End: 2023-12-01

## 2023-12-01 VITALS — WEIGHT: 131 LBS | HEIGHT: 68 IN | BODY MASS INDEX: 19.85 KG/M2

## 2023-12-01 DIAGNOSIS — M72.0 DUPUYTREN'S DISEASE OF PALM OF LEFT HAND: ICD-10-CM

## 2023-12-01 DIAGNOSIS — M18.12 ARTHRITIS OF CARPOMETACARPAL (CMC) JOINT OF LEFT THUMB: Primary | ICD-10-CM

## 2023-12-01 NOTE — TELEPHONE ENCOUNTER
----- Message from Nicol Cano MA sent at 12/1/2023  9:47 AM EST -----  Subject: Message to Provider    QUESTIONS  Information for Provider? Please call PT to R/S lab appt for early June 2024. Prefers June 3rd.   ---------------------------------------------------------------------------  --------------  Chelsea MAYERS  [phone]; OK to leave message on voicemail  ---------------------------------------------------------------------------  --------------  SCRIPT ANSWERS  Relationship to Patient?  Self

## 2023-12-06 ENCOUNTER — PATIENT MESSAGE (OUTPATIENT)
Age: 63
End: 2023-12-06

## 2023-12-06 DIAGNOSIS — G47.00 INSOMNIA, UNSPECIFIED TYPE: Primary | ICD-10-CM

## 2023-12-07 ENCOUNTER — TELEPHONE (OUTPATIENT)
Age: 63
End: 2023-12-07

## 2023-12-07 NOTE — TELEPHONE ENCOUNTER
----- Message from Bakari Lopez sent at 12/6/2023  3:45 PM EST -----  Subject: Message to Provider    QUESTIONS  Information for Provider? Patient wants to know how the practice would   like for her to send her durable power of  paperwork to the   office. Her appointment is in June and she would like to get it to you   sooner. Please call the patient.  ---------------------------------------------------------------------------  --------------  Karime Tony INFO  [phone]; OK to leave message on voicemail  ---------------------------------------------------------------------------  --------------  SCRIPT ANSWERS  Relationship to Patient?  Self

## 2023-12-08 RX ORDER — ESZOPICLONE 2 MG/1
2 TABLET, FILM COATED ORAL NIGHTLY
Qty: 90 TABLET | Refills: 0 | Status: SHIPPED | OUTPATIENT
Start: 2023-12-08 | End: 2024-03-07

## 2023-12-08 NOTE — TELEPHONE ENCOUNTER
Reviewed report generated by the [facility]. Does not demonstrate aberrancies or inconsistencies with regard to the prescribing of controlled medications to this patient by other providers. Last filled 4/6/2022 per .

## 2024-01-22 ENCOUNTER — PATIENT MESSAGE (OUTPATIENT)
Age: 64
End: 2024-01-22

## 2024-01-22 NOTE — TELEPHONE ENCOUNTER
From: Nina Felipe  To: Dr. Purnima Dangelo  Sent: 1/22/2024 9:29 AM EST  Subject: Blood Pressure high    I have been monitoring my blood pressure for the last month as I’ve been having a mild headache and was trying to find the cause. My readings are consistently in the 150/80 range.  Today the dermatologist checked my readings a few times as still high. I would like to be seen prior to my June appt. Please contact me to schedule.

## 2024-02-08 ENCOUNTER — OFFICE VISIT (OUTPATIENT)
Age: 64
End: 2024-02-08
Payer: COMMERCIAL

## 2024-02-08 VITALS
RESPIRATION RATE: 16 BRPM | BODY MASS INDEX: 19.85 KG/M2 | TEMPERATURE: 97.9 F | DIASTOLIC BLOOD PRESSURE: 68 MMHG | HEART RATE: 78 BPM | OXYGEN SATURATION: 100 % | HEIGHT: 68 IN | SYSTOLIC BLOOD PRESSURE: 128 MMHG | WEIGHT: 131 LBS

## 2024-02-08 DIAGNOSIS — R03.0 WHITE COAT SYNDROME WITH HIGH BLOOD PRESSURE BUT WITHOUT HYPERTENSION: Primary | ICD-10-CM

## 2024-02-08 DIAGNOSIS — M18.12 PRIMARY OSTEOARTHRITIS OF FIRST CARPOMETACARPAL JOINT OF LEFT HAND: ICD-10-CM

## 2024-02-08 DIAGNOSIS — Z86.006 HISTORY OF MELANOMA IN SITU: ICD-10-CM

## 2024-02-08 DIAGNOSIS — L65.8 FEMALE PATTERN HAIR LOSS: ICD-10-CM

## 2024-02-08 DIAGNOSIS — M72.0 DUPUYTREN'S CONTRACTURE OF LEFT HAND: ICD-10-CM

## 2024-02-08 DIAGNOSIS — G47.00 INSOMNIA, UNSPECIFIED TYPE: ICD-10-CM

## 2024-02-08 PROCEDURE — 1123F ACP DISCUSS/DSCN MKR DOCD: CPT | Performed by: INTERNAL MEDICINE

## 2024-02-08 PROCEDURE — 99214 OFFICE O/P EST MOD 30 MIN: CPT | Performed by: INTERNAL MEDICINE

## 2024-02-08 ASSESSMENT — PATIENT HEALTH QUESTIONNAIRE - PHQ9
7. TROUBLE CONCENTRATING ON THINGS, SUCH AS READING THE NEWSPAPER OR WATCHING TELEVISION: 0
1. LITTLE INTEREST OR PLEASURE IN DOING THINGS: 0
3. TROUBLE FALLING OR STAYING ASLEEP: 0
SUM OF ALL RESPONSES TO PHQ QUESTIONS 1-9: 0
5. POOR APPETITE OR OVEREATING: 0
8. MOVING OR SPEAKING SO SLOWLY THAT OTHER PEOPLE COULD HAVE NOTICED. OR THE OPPOSITE, BEING SO FIGETY OR RESTLESS THAT YOU HAVE BEEN MOVING AROUND A LOT MORE THAN USUAL: 0
SUM OF ALL RESPONSES TO PHQ QUESTIONS 1-9: 0
2. FEELING DOWN, DEPRESSED OR HOPELESS: 0
SUM OF ALL RESPONSES TO PHQ QUESTIONS 1-9: 0
10. IF YOU CHECKED OFF ANY PROBLEMS, HOW DIFFICULT HAVE THESE PROBLEMS MADE IT FOR YOU TO DO YOUR WORK, TAKE CARE OF THINGS AT HOME, OR GET ALONG WITH OTHER PEOPLE: 0
SUM OF ALL RESPONSES TO PHQ9 QUESTIONS 1 & 2: 0
SUM OF ALL RESPONSES TO PHQ QUESTIONS 1-9: 0
4. FEELING TIRED OR HAVING LITTLE ENERGY: 0
6. FEELING BAD ABOUT YOURSELF - OR THAT YOU ARE A FAILURE OR HAVE LET YOURSELF OR YOUR FAMILY DOWN: 0
9. THOUGHTS THAT YOU WOULD BE BETTER OFF DEAD, OR OF HURTING YOURSELF: 0

## 2024-02-08 NOTE — PATIENT INSTRUCTIONS
Learning About Low-Sodium Foods  What foods are low in sodium?    The foods you eat contain nutrients, such as vitamins and minerals. Sodium is a nutrient. Your body needs the right amount to stay healthy and work as it should. You can use the list below to help you make choices about which foods to eat.  Fruits  Fresh, frozen, canned, or dried fruit  Vegetables  Fresh or frozen vegetables, with no added salt  Canned vegetables, low-sodium or with no added salt  Grains  Bagels without salted tops  Cereal with no added salt  Corn tortillas  Crackers with no added salt  Oatmeal, cooked without salt  Popcorn with no salt  Pasta and noodles, cooked without salt  Rice, cooked without salt  Unsalted pretzels  Dairy and dairy alternatives  Butter, unsalted  Cream cheese  Ice cream  Milk  Soy milk  Meats and other protein foods  Beans and peas, canned with no salt  Eggs  Fresh fish (not smoked)  Fresh meats (not smoked or cured)  Nuts and nut butter, prepared without salt  Poultry, not packaged with sodium solution  Tofu, unseasoned  Tuna, canned without salt  Seasonings  Garlic  Herbs and spices  Lemon juice  Mustard  Olive oil  Salt-free seasoning mixes  Vinegar  Work with your doctor to find out how much of this nutrient you need. Depending on your health, you may need more or less of it in your diet.  Where can you learn more?  Go to https://www.[employer].net/[employer]  Enter S460 in the search box to learn more about \"Learning About Low-Sodium Foods.\"  Current as of: August 22, 2019               Content Version: 12.6  © 3339-2272 [employer].   Care instructions adapted under license by [employer] (which disclaims liability or warranty for this information). If you have questions about a medical condition or this instruction, always ask your healthcare professional. [employer] disclaims any warranty or liability for your use of this information.      Low Sodium Diet

## 2024-02-09 NOTE — PROGRESS NOTES
Nina Felipe presents today for   Chief Complaint   Patient presents with    Follow-up       \"Have you been to the ER, urgent care clinic since your last visit?  Hospitalized since your last visit?\"    NO    “Have you seen or consulted any other health care providers outside of [facility] since your last visit?”    NO            
loss. TSH, DHEA, and testosterone normal (5/2021). Under the care of dermatologist, Dr. Gaming, and being treated with spironolactone and dutasteride.  Has been stable and did not tolerate holding spironolactone as hair loss increased.  Considering holding dutasteride to see if it is providing benefit.  Continue to monitor.  3. Reactive depression.  In 5/2021, reported symptoms of depression present for at least 6 months and associated with nearly 10 pound weight loss. No anxiety or suicidal thoughts.  Started on Zoloft 25 mg daily and found to be helpful.  Weaned after approximately 3 months of treatment since symptoms improved.  Reports intermittent symptoms related to milestones following her 's death.  Managing without medication and not wishing to restart.  4. Insomnia. Chronic problem. Using Lunesta once or twice per month with good response. Likely was exacerbated by issues with depression but seems to have improved.  Will continue to monitor.   5. History of migraine headaches. Reports usually right sided and would respond to naproxen. Significantly improved since completion of menopause. Will continue to monitor.  6. Vitamin D deficiency. Vitamin D level very low in 5/2021 and prescribed ergocalciferol 50,000 U weekly for 24 weeks and advised to continue on maintenance dose 2000 U supplement after completion.  Repeat in 5/2023 remains normal and advised to continue on maintenance dose supplement  7. Left thumb osteoarthritis with left Dupuytren's contracture.  Described left wrist pain without evidence of active synovitis on exam.  Underwent left wrist x-ray (9/29/2021) which showed evidence of mild first CMC osteoarthritis.  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  Also developing evidence of Dupuytren's contracture of left palm.  Underwent evaluation by Dr. Calles in 12/2023 and noted improvement following cortisone injection in the left CMC joint.  Scheduled for reevaluation with Dr. Cervantes

## 2024-02-11 PROBLEM — R03.0 WHITE COAT SYNDROME WITH HIGH BLOOD PRESSURE BUT WITHOUT HYPERTENSION: Status: ACTIVE | Noted: 2024-02-11

## 2024-03-19 ENCOUNTER — PATIENT MESSAGE (OUTPATIENT)
Age: 64
End: 2024-03-19

## 2024-03-19 NOTE — TELEPHONE ENCOUNTER
From: Nina Felipe  To: Dr. Garrett Calles  Sent: 3/19/2024 12:30 PM EDT  Subject: X Ray request    I am re-requesting hand X-rays from Dr Calles’s office. I will  on a disc as soon as you have it ready. I was charged for having the records sent to Dr Cervantes’s office and had previously requested my complete record sent. The X-rays in the format you sent can not be read as per Dr Cervantes. Look forward to hearing when the disc is ready to be picked up.     Thank you,  Nina Felipe

## 2024-03-21 ENCOUNTER — PATIENT MESSAGE (OUTPATIENT)
Age: 64
End: 2024-03-21

## 2024-03-22 NOTE — TELEPHONE ENCOUNTER
From: Nina Felipe  To: Dr. Purnima Dangelo  Sent: 3/21/2024 5:31 PM EDT  Subject: Colonoscopy    Please fax my last visit with Dr Dangelo to Dr Dusty Orozco. I am due for a colonoscopy this August and they request this be done prior to them calling me to schedule.   His fax is [phone]    Also my BP readings have been in the normal range.

## 2024-04-23 ENCOUNTER — PATIENT MESSAGE (OUTPATIENT)
Age: 64
End: 2024-04-23

## 2024-04-24 NOTE — TELEPHONE ENCOUNTER
If she is talking about the DEXA scan for screening for osteoporosis  [place] 39[address] and the ICD would be Z13.820, correct?

## 2024-05-28 ENCOUNTER — TELEPHONE (OUTPATIENT)
Age: 64
End: 2024-05-28

## 2024-05-28 ENCOUNTER — HOSPITAL ENCOUNTER (OUTPATIENT)
Facility: HOSPITAL | Age: 64
Setting detail: SPECIMEN
Discharge: HOME | End: 2024-05-31
Payer: COMMERCIAL

## 2024-05-28 DIAGNOSIS — Z78.0 POSTMENOPAUSAL STATUS (AGE-RELATED) (NATURAL): Primary | ICD-10-CM

## 2024-05-28 PROCEDURE — 87624 HPV HI-RISK TYP POOLED RSLT: CPT

## 2024-05-28 PROCEDURE — 88175 CYTOPATH C/V AUTO FLUID REDO: CPT

## 2024-05-28 NOTE — TELEPHONE ENCOUNTER
----- Message from Pat Tiwari sent at 5/28/2024 11:40 AM EDT -----  Subject: Referral Request    Reason for referral request? Bone Density  Provider patient wants to be referred to(if known):     Provider Phone Number(if known):    Additional Information for Provider? Pt would like to have her yearly bone   density, requests codes used - CPT code 06964 and ICD10 213.820 and Z78   (or 278, she wasn't sure based on the handwriting whether Z or 2, but   thinks it's a Z) Please call when order is ready.   ---------------------------------------------------------------------------  --------------  CALL BACK INFO    [phone]; OK to leave message on voicemail  ---------------------------------------------------------------------------  --------------

## 2024-06-04 ENCOUNTER — APPOINTMENT (OUTPATIENT)
Facility: CLINIC | Age: 64
End: 2024-06-04
Attending: INTERNAL MEDICINE
Payer: COMMERCIAL

## 2024-06-04 ENCOUNTER — HOSPITAL ENCOUNTER (OUTPATIENT)
Facility: HOSPITAL | Age: 64
Discharge: HOME | End: 2024-06-07
Attending: INTERNAL MEDICINE
Payer: COMMERCIAL

## 2024-06-04 ENCOUNTER — HOSPITAL ENCOUNTER (OUTPATIENT)
Facility: HOSPITAL | Age: 64
Setting detail: SPECIMEN
Discharge: HOME | End: 2024-06-07
Attending: INTERNAL MEDICINE
Payer: COMMERCIAL

## 2024-06-04 DIAGNOSIS — Z78.0 POSTMENOPAUSAL STATUS (AGE-RELATED) (NATURAL): ICD-10-CM

## 2024-06-04 DIAGNOSIS — E55.9 VITAMIN D DEFICIENCY: ICD-10-CM

## 2024-06-04 DIAGNOSIS — L65.8 FEMALE PATTERN HAIR LOSS: ICD-10-CM

## 2024-06-04 DIAGNOSIS — Z00.00 LABORATORY TESTS ORDERED AS PART OF A COMPLETE PHYSICAL EXAM (CPE): ICD-10-CM

## 2024-06-04 DIAGNOSIS — E78.00 PURE HYPERCHOLESTEROLEMIA: ICD-10-CM

## 2024-06-04 LAB
25(OH)D3 SERPL-MCNC: 87.2 NG/ML (ref 30–100)
ALBUMIN SERPL-MCNC: 4.1 G/DL (ref 3.4–5)
ALBUMIN/GLOB SERPL: 1.4 (ref 0.8–1.7)
ALP SERPL-CCNC: 82 U/L (ref 45–117)
ALT SERPL-CCNC: 19 U/L (ref 13–56)
ANION GAP SERPL CALC-SCNC: 8 MMOL/L (ref 3–18)
APPEARANCE UR: CLEAR
AST SERPL-CCNC: 15 U/L (ref 10–38)
BACTERIA URNS QL MICRO: NEGATIVE /HPF
BASOPHILS # BLD: 0 K/UL (ref 0–0.1)
BASOPHILS NFR BLD: 1 % (ref 0–2)
BILIRUB SERPL-MCNC: 0.7 MG/DL (ref 0.2–1)
BILIRUB UR QL: NEGATIVE
BUN SERPL-MCNC: 17 MG/DL (ref 7–18)
BUN/CREAT SERPL: 23 (ref 12–20)
CALCIUM SERPL-MCNC: 9.2 MG/DL (ref 8.5–10.1)
CHLORIDE SERPL-SCNC: 101 MMOL/L (ref 100–111)
CHOLEST SERPL-MCNC: 163 MG/DL
CO2 SERPL-SCNC: 26 MMOL/L (ref 21–32)
COLOR UR: YELLOW
CREAT SERPL-MCNC: 0.73 MG/DL (ref 0.6–1.3)
DIFFERENTIAL METHOD BLD: NORMAL
EOSINOPHIL # BLD: 0.1 K/UL (ref 0–0.4)
EOSINOPHIL NFR BLD: 1 % (ref 0–5)
EPITH CASTS URNS QL MICRO: NORMAL /LPF (ref 0–5)
ERYTHROCYTE [DISTWIDTH] IN BLOOD BY AUTOMATED COUNT: 12.3 % (ref 11.6–14.5)
GLOBULIN SER CALC-MCNC: 3 G/DL (ref 2–4)
GLUCOSE SERPL-MCNC: 94 MG/DL (ref 74–99)
GLUCOSE UR STRIP.AUTO-MCNC: NEGATIVE MG/DL
HCT VFR BLD AUTO: 43.7 % (ref 35–45)
HDLC SERPL-MCNC: 92 MG/DL (ref 40–60)
HDLC SERPL: 1.8 (ref 0–5)
HGB BLD-MCNC: 14.1 G/DL (ref 12–16)
HGB UR QL STRIP: NEGATIVE
IMM GRANULOCYTES # BLD AUTO: 0 K/UL (ref 0–0.04)
IMM GRANULOCYTES NFR BLD AUTO: 0 % (ref 0–0.5)
KETONES UR QL STRIP.AUTO: NEGATIVE MG/DL
LDLC SERPL CALC-MCNC: 60 MG/DL (ref 0–100)
LEUKOCYTE ESTERASE UR QL STRIP.AUTO: NEGATIVE
LIPID PANEL: ABNORMAL
LYMPHOCYTES # BLD: 1.8 K/UL (ref 0.9–3.6)
LYMPHOCYTES NFR BLD: 32 % (ref 21–52)
MCH RBC QN AUTO: 30.7 PG (ref 24–34)
MCHC RBC AUTO-ENTMCNC: 32.3 G/DL (ref 31–37)
MCV RBC AUTO: 95 FL (ref 78–100)
MONOCYTES # BLD: 0.6 K/UL (ref 0.05–1.2)
MONOCYTES NFR BLD: 9 % (ref 3–10)
NEUTS SEG # BLD: 3.4 K/UL (ref 1.8–8)
NEUTS SEG NFR BLD: 57 % (ref 40–73)
NITRITE UR QL STRIP.AUTO: NEGATIVE
NRBC # BLD: 0 K/UL (ref 0–0.01)
NRBC BLD-RTO: 0 PER 100 WBC
PH UR STRIP: 6.5 (ref 5–8)
PLATELET # BLD AUTO: 313 K/UL (ref 135–420)
PMV BLD AUTO: 9.6 FL (ref 9.2–11.8)
POTASSIUM SERPL-SCNC: 4.2 MMOL/L (ref 3.5–5.5)
PROT SERPL-MCNC: 7.1 G/DL (ref 6.4–8.2)
PROT UR STRIP-MCNC: NEGATIVE MG/DL
RBC # BLD AUTO: 4.6 M/UL (ref 4.2–5.3)
RBC #/AREA URNS HPF: NEGATIVE /HPF (ref 0–5)
SODIUM SERPL-SCNC: 135 MMOL/L (ref 136–145)
SP GR UR REFRACTOMETRY: <1.005 (ref 1–1.03)
TRIGL SERPL-MCNC: 55 MG/DL
TSH SERPL DL<=0.05 MIU/L-ACNC: 1.92 UIU/ML (ref 0.36–3.74)
UROBILINOGEN UR QL STRIP.AUTO: 0.2 EU/DL (ref 0.2–1)
VLDLC SERPL CALC-MCNC: 11 MG/DL
WBC # BLD AUTO: 5.9 K/UL (ref 4.6–13.2)
WBC URNS QL MICRO: NEGATIVE /HPF (ref 0–4)

## 2024-06-04 PROCEDURE — 36415 COLL VENOUS BLD VENIPUNCTURE: CPT

## 2024-06-04 PROCEDURE — 81001 URINALYSIS AUTO W/SCOPE: CPT

## 2024-06-04 PROCEDURE — 84443 ASSAY THYROID STIM HORMONE: CPT

## 2024-06-04 PROCEDURE — 82306 VITAMIN D 25 HYDROXY: CPT

## 2024-06-04 PROCEDURE — 80053 COMPREHEN METABOLIC PANEL: CPT

## 2024-06-04 PROCEDURE — 80061 LIPID PANEL: CPT

## 2024-06-04 PROCEDURE — 77080 DXA BONE DENSITY AXIAL: CPT

## 2024-06-04 PROCEDURE — 85025 COMPLETE CBC W/AUTO DIFF WBC: CPT

## 2024-06-11 PROBLEM — R87.810 CERVICAL HIGH RISK HUMAN PAPILLOMAVIRUS (HPV) DNA TEST POSITIVE: Status: ACTIVE | Noted: 2024-06-11

## 2024-06-15 PROBLEM — M85.89 OSTEOPENIA OF MULTIPLE SITES: Status: ACTIVE | Noted: 2024-06-15

## 2024-06-15 PROBLEM — Z86.19 HISTORY OF INFECTION DUE TO HUMAN PAPILLOMA VIRUS (HPV): Status: RESOLVED | Noted: 2022-05-05 | Resolved: 2024-06-15

## 2024-07-11 ENCOUNTER — PATIENT MESSAGE (OUTPATIENT)
Facility: CLINIC | Age: 64
End: 2024-07-11

## 2024-07-11 RX ORDER — SERTRALINE HYDROCHLORIDE 25 MG/1
25 TABLET, FILM COATED ORAL DAILY
Qty: 90 TABLET | Refills: 3 | Status: SHIPPED | OUTPATIENT
Start: 2024-07-11

## 2024-07-11 RX ORDER — SERTRALINE HYDROCHLORIDE 25 MG/1
25 TABLET, FILM COATED ORAL DAILY
COMMUNITY
Start: 2024-07-07 | End: 2024-07-11 | Stop reason: SDUPTHER

## 2024-07-11 NOTE — TELEPHONE ENCOUNTER
From: Nina Felipe  To: Dr. Purnima Dangelo  Sent: 7/11/2024 7:36 AM EDT  Subject: Sertraline 25    I stopped this medication (for almost a year) as I discussed with you on my last visit. However I restarted it last week. Could you send in a 90 day supply? Thank you

## 2024-07-26 ENCOUNTER — PATIENT MESSAGE (OUTPATIENT)
Facility: CLINIC | Age: 64
End: 2024-07-26

## 2024-08-03 RX ORDER — ATORVASTATIN CALCIUM 20 MG/1
20 TABLET, FILM COATED ORAL NIGHTLY
Qty: 90 TABLET | Refills: 3 | Status: SHIPPED | OUTPATIENT
Start: 2024-08-03

## 2024-09-18 ENCOUNTER — PATIENT MESSAGE (OUTPATIENT)
Facility: CLINIC | Age: 64
End: 2024-09-18

## 2024-10-04 ENCOUNTER — TELEPHONE (OUTPATIENT)
Facility: CLINIC | Age: 64
End: 2024-10-04

## 2024-10-04 NOTE — TELEPHONE ENCOUNTER
Diagnosis code was E55.9.  That was given in addition to the Z00.00 Code.  Not sure what the issue was but please clarify with insurance

## 2024-10-04 NOTE — TELEPHONE ENCOUNTER
Patient and insurance called about the Vitamin D bloodwork and they state the code needs to be changed for insurance to cover the bloodwork. They state the order was billed as routine and vit d not covered under wellness. The code needs to show past deficiency for it to be covered. Please advise.    Please let patient know when code is fixed.

## 2025-05-06 ENCOUNTER — TELEPHONE (OUTPATIENT)
Facility: CLINIC | Age: 65
End: 2025-05-06

## 2025-05-06 ENCOUNTER — TELEMEDICINE (OUTPATIENT)
Facility: CLINIC | Age: 65
End: 2025-05-06
Payer: COMMERCIAL

## 2025-05-06 DIAGNOSIS — G89.29 CHRONIC PAIN OF LEFT THUMB: ICD-10-CM

## 2025-05-06 DIAGNOSIS — L65.8 FEMALE PATTERN HAIR LOSS: ICD-10-CM

## 2025-05-06 DIAGNOSIS — F41.9 ANXIETY: ICD-10-CM

## 2025-05-06 DIAGNOSIS — M79.645 CHRONIC PAIN OF LEFT THUMB: ICD-10-CM

## 2025-05-06 DIAGNOSIS — M19.042 PRIMARY OSTEOARTHRITIS OF LEFT HAND: ICD-10-CM

## 2025-05-06 DIAGNOSIS — R03.0 WHITE COAT SYNDROME WITH HIGH BLOOD PRESSURE BUT WITHOUT HYPERTENSION: Primary | ICD-10-CM

## 2025-05-06 PROCEDURE — 1123F ACP DISCUSS/DSCN MKR DOCD: CPT | Performed by: INTERNAL MEDICINE

## 2025-05-06 PROCEDURE — 99214 OFFICE O/P EST MOD 30 MIN: CPT | Performed by: INTERNAL MEDICINE

## 2025-05-06 SDOH — ECONOMIC STABILITY: FOOD INSECURITY: WITHIN THE PAST 12 MONTHS, YOU WORRIED THAT YOUR FOOD WOULD RUN OUT BEFORE YOU GOT MONEY TO BUY MORE.: NEVER TRUE

## 2025-05-06 SDOH — ECONOMIC STABILITY: FOOD INSECURITY: WITHIN THE PAST 12 MONTHS, THE FOOD YOU BOUGHT JUST DIDN'T LAST AND YOU DIDN'T HAVE MONEY TO GET MORE.: NEVER TRUE

## 2025-05-06 ASSESSMENT — PATIENT HEALTH QUESTIONNAIRE - PHQ9
3. TROUBLE FALLING OR STAYING ASLEEP: NOT AT ALL
5. POOR APPETITE OR OVEREATING: NOT AT ALL
8. MOVING OR SPEAKING SO SLOWLY THAT OTHER PEOPLE COULD HAVE NOTICED. OR THE OPPOSITE, BEING SO FIGETY OR RESTLESS THAT YOU HAVE BEEN MOVING AROUND A LOT MORE THAN USUAL: NOT AT ALL
2. FEELING DOWN, DEPRESSED OR HOPELESS: NOT AT ALL
7. TROUBLE CONCENTRATING ON THINGS, SUCH AS READING THE NEWSPAPER OR WATCHING TELEVISION: NOT AT ALL
SUM OF ALL RESPONSES TO PHQ QUESTIONS 1-9: 0
4. FEELING TIRED OR HAVING LITTLE ENERGY: NOT AT ALL
SUM OF ALL RESPONSES TO PHQ QUESTIONS 1-9: 0
1. LITTLE INTEREST OR PLEASURE IN DOING THINGS: NOT AT ALL
SUM OF ALL RESPONSES TO PHQ QUESTIONS 1-9: 0
10. IF YOU CHECKED OFF ANY PROBLEMS, HOW DIFFICULT HAVE THESE PROBLEMS MADE IT FOR YOU TO DO YOUR WORK, TAKE CARE OF THINGS AT HOME, OR GET ALONG WITH OTHER PEOPLE: NOT DIFFICULT AT ALL
6. FEELING BAD ABOUT YOURSELF - OR THAT YOU ARE A FAILURE OR HAVE LET YOURSELF OR YOUR FAMILY DOWN: NOT AT ALL
SUM OF ALL RESPONSES TO PHQ QUESTIONS 1-9: 0
9. THOUGHTS THAT YOU WOULD BE BETTER OFF DEAD, OR OF HURTING YOURSELF: NOT AT ALL

## 2025-05-06 NOTE — TELEPHONE ENCOUNTER
Pt called c/o of BP concerns. Pt c/o of HTN. HTN sx started yesterday which she stated she had a headache. Pt c/o of lightheadness today.BP readings: 147/98; Pulse: 99: Yesterday per pt readings were as high as 148/101. Pt scheduled for VV today @ 11:30.       [FreeTextEntry1] : Patient was also reminded to have routine eye exam, dental care and skin exam with a dermatologist.

## 2025-05-06 NOTE — PROGRESS NOTES
2025    TELEHEALTH EVALUATION -- Audio/Visual    HPI:    Nina Felipe (:  1960) has requested an audio/video evaluation for the following concern(s):  elevated blood pressure    HPI:   Nina Felipe is a 65 y.o. year old female who presents today for an acute visit. She has a history of hyperlipidemia, insomnia, and androgenic hair loss. She reports that she is doing reasonably well.      On 2024, she presented due to concern regarding elevated blood pressure readings with associated mild headache.  However, when monitoring at home 3 times daily, she noted that readings were controlled generally 100-120/75-80.  She reported occasionally noting some elevated midday readings of approximately 140s/90s but improved and when monitored in the morning and evening hours.  She was noted to have elevated blood pressure initially in the office but it was noted to normalize upon repeat measurement and no medication was felt to be needed.  She was seen in follow-up on 2024 and was again noted to have initial elevation of blood pressure which normalized upon repeat measurement.  She discusses today that she has not been monitoring her blood pressure regularly since her last visit, but began experiencing a mild headache yesterday.  She states that when she assessed her blood pressure midday, she noted that it was elevated in the 140s/100s, but then repeat measurement later that night was normal.  She states that today she began to experience some lightheadedness while at work and noted similar elevation of her blood pressure and also noted that her heart rate was in the 90s, so she contacted the office for this appointment.  She states that she is eating a healthy diet without excessive sodium and exercises regularly.  She also reports that she is sleeping well at night and denies any increase in anxiety.  She denies any chest pain, palpitations, shortness of breath, PND, orthopnea, or lower extremity edema.

## 2025-05-06 NOTE — PROGRESS NOTES
Nina Felipe presents today for   Chief Complaint   Patient presents with    Hypertension     Elevated BP           \"Have you been to the ER, urgent care clinic since your last visit?  Hospitalized since your last visit?\"    NO    “Have you seen or consulted any other health care providers outside of [facility] since your last visit?”    NO

## 2025-05-28 RX ORDER — ATORVASTATIN CALCIUM 20 MG/1
20 TABLET, FILM COATED ORAL NIGHTLY
Qty: 90 TABLET | Refills: 3 | Status: SHIPPED | OUTPATIENT
Start: 2025-05-28

## 2025-06-10 ENCOUNTER — HOSPITAL ENCOUNTER (OUTPATIENT)
Facility: HOSPITAL | Age: 65
Setting detail: SPECIMEN
Discharge: HOME | End: 2025-06-13
Payer: COMMERCIAL

## 2025-06-10 DIAGNOSIS — E78.00 PURE HYPERCHOLESTEROLEMIA: ICD-10-CM

## 2025-06-10 DIAGNOSIS — R63.4 WEIGHT LOSS: ICD-10-CM

## 2025-06-10 DIAGNOSIS — Z00.00 LABORATORY TESTS ORDERED AS PART OF A COMPLETE PHYSICAL EXAM (CPE): ICD-10-CM

## 2025-06-10 LAB
ALBUMIN SERPL-MCNC: 4.2 G/DL (ref 3.4–5)
ALBUMIN/GLOB SERPL: 1.4 (ref 0.8–1.7)
ALP SERPL-CCNC: 70 U/L (ref 45–117)
ALT SERPL-CCNC: 22 U/L (ref 10–35)
ANION GAP SERPL CALC-SCNC: 10 MMOL/L (ref 3–18)
APPEARANCE UR: CLEAR
AST SERPL-CCNC: 25 U/L (ref 10–38)
BACTERIA URNS QL MICRO: ABNORMAL /HPF
BASOPHILS # BLD: 0.04 K/UL (ref 0–0.1)
BASOPHILS NFR BLD: 0.7 % (ref 0–2)
BILIRUB SERPL-MCNC: 0.7 MG/DL (ref 0.2–1)
BILIRUB UR QL: NEGATIVE
BUN SERPL-MCNC: 13 MG/DL (ref 6–23)
BUN/CREAT SERPL: 15 (ref 12–20)
CALCIUM SERPL-MCNC: 10.8 MG/DL (ref 8.5–10.1)
CHLORIDE SERPL-SCNC: 101 MMOL/L (ref 98–107)
CHOLEST SERPL-MCNC: 192 MG/DL
CO2 SERPL-SCNC: 27 MMOL/L (ref 21–32)
COLOR UR: YELLOW
CREAT SERPL-MCNC: 0.87 MG/DL (ref 0.6–1.3)
DIFFERENTIAL METHOD BLD: ABNORMAL
EOSINOPHIL # BLD: 0.08 K/UL (ref 0–0.4)
EOSINOPHIL NFR BLD: 1.3 % (ref 0–5)
EPITH CASTS URNS QL MICRO: ABNORMAL /LPF (ref 0–5)
ERYTHROCYTE [DISTWIDTH] IN BLOOD BY AUTOMATED COUNT: 12.4 % (ref 11.6–14.5)
GLOBULIN SER CALC-MCNC: 3 G/DL (ref 2–4)
GLUCOSE SERPL-MCNC: 89 MG/DL (ref 74–108)
GLUCOSE UR STRIP.AUTO-MCNC: NEGATIVE MG/DL
HCT VFR BLD AUTO: 47.9 % (ref 35–45)
HDLC SERPL-MCNC: 87 MG/DL (ref 40–60)
HDLC SERPL: 2.2 (ref 0–5)
HGB BLD-MCNC: 15 G/DL (ref 12–16)
HGB UR QL STRIP: NEGATIVE
IMM GRANULOCYTES # BLD AUTO: 0.01 K/UL (ref 0–0.04)
IMM GRANULOCYTES NFR BLD AUTO: 0.2 % (ref 0–0.5)
KETONES UR QL STRIP.AUTO: NEGATIVE MG/DL
LDLC SERPL CALC-MCNC: 91 MG/DL (ref 0–100)
LEUKOCYTE ESTERASE UR QL STRIP.AUTO: NEGATIVE
LYMPHOCYTES # BLD: 2.2 K/UL (ref 0.9–3.6)
LYMPHOCYTES NFR BLD: 37 % (ref 21–52)
MCH RBC QN AUTO: 29.8 PG (ref 24–34)
MCHC RBC AUTO-ENTMCNC: 31.3 G/DL (ref 31–37)
MCV RBC AUTO: 95.2 FL (ref 78–100)
MONOCYTES # BLD: 0.53 K/UL (ref 0.05–1.2)
MONOCYTES NFR BLD: 8.9 % (ref 3–10)
NEUTS SEG # BLD: 3.09 K/UL (ref 1.8–8)
NEUTS SEG NFR BLD: 51.9 % (ref 40–73)
NITRITE UR QL STRIP.AUTO: NEGATIVE
NRBC # BLD: 0 K/UL (ref 0–0.01)
NRBC BLD-RTO: 0 PER 100 WBC
PH UR STRIP: 6.5 (ref 5–8)
PLATELET # BLD AUTO: 356 K/UL (ref 135–420)
PMV BLD AUTO: 9.5 FL (ref 9.2–11.8)
POTASSIUM SERPL-SCNC: 5.9 MMOL/L (ref 3.5–5.5)
PROT SERPL-MCNC: 7.2 G/DL (ref 6.4–8.2)
PROT UR STRIP-MCNC: NEGATIVE MG/DL
RBC # BLD AUTO: 5.03 M/UL (ref 4.2–5.3)
RBC #/AREA URNS HPF: ABNORMAL /HPF (ref 0–5)
SODIUM SERPL-SCNC: 137 MMOL/L (ref 136–145)
SP GR UR REFRACTOMETRY: <1.005 (ref 1–1.03)
TRIGL SERPL-MCNC: 71 MG/DL (ref 0–150)
TSH, 3RD GENERATION: 1.47 UIU/ML (ref 0.27–4.2)
UROBILINOGEN UR QL STRIP.AUTO: 0.2 EU/DL (ref 0.2–1)
VLDLC SERPL CALC-MCNC: 14 MG/DL
WBC # BLD AUTO: 6 K/UL (ref 4.6–13.2)
WBC URNS QL MICRO: ABNORMAL /HPF (ref 0–4)

## 2025-06-10 PROCEDURE — 80061 LIPID PANEL: CPT

## 2025-06-10 PROCEDURE — 80053 COMPREHEN METABOLIC PANEL: CPT

## 2025-06-10 PROCEDURE — 85025 COMPLETE CBC W/AUTO DIFF WBC: CPT

## 2025-06-10 PROCEDURE — 84443 ASSAY THYROID STIM HORMONE: CPT

## 2025-06-10 PROCEDURE — 81001 URINALYSIS AUTO W/SCOPE: CPT

## 2025-06-10 PROCEDURE — 36415 COLL VENOUS BLD VENIPUNCTURE: CPT

## 2025-06-11 ENCOUNTER — PATIENT MESSAGE (OUTPATIENT)
Facility: CLINIC | Age: 65
End: 2025-06-11

## 2025-06-12 ENCOUNTER — RESULTS FOLLOW-UP (OUTPATIENT)
Facility: CLINIC | Age: 65
End: 2025-06-12

## 2025-06-12 DIAGNOSIS — E87.5 HYPERKALEMIA: Primary | ICD-10-CM

## 2025-06-12 DIAGNOSIS — E83.52 HYPERCALCEMIA: ICD-10-CM

## 2025-06-12 NOTE — TELEPHONE ENCOUNTER
Called and spoke with patient about previsit labs which showed hyperkalemia and hypercalcemia.  Patient reports that she discontinued spironolactone when she recognized the high potassium level.  Has started a calcium supplement and advised to discontinue.  Denies any electrolyte drinks or NSAID use.  Will reassess CMP prior to her upcoming visit to see if high levels have corrected.  Order placed.

## 2025-06-14 ENCOUNTER — HOSPITAL ENCOUNTER (OUTPATIENT)
Age: 65
Discharge: HOME | End: 2025-06-14
Payer: COMMERCIAL

## 2025-06-14 DIAGNOSIS — E87.5 HYPERKALEMIA: ICD-10-CM

## 2025-06-14 DIAGNOSIS — E83.52 HYPERCALCEMIA: ICD-10-CM

## 2025-06-14 LAB
25(OH)D3 SERPL-MCNC: 29.6 NG/ML (ref 30–100)
ALBUMIN SERPL-MCNC: 3.7 G/DL (ref 3.4–5)
ALBUMIN/GLOB SERPL: 1.4 (ref 0.8–1.7)
ALP SERPL-CCNC: 58 U/L (ref 45–117)
ALT SERPL-CCNC: 18 U/L (ref 10–35)
ANION GAP SERPL CALC-SCNC: 9 MMOL/L (ref 3–18)
AST SERPL-CCNC: 23 U/L (ref 10–38)
BILIRUB SERPL-MCNC: 0.4 MG/DL (ref 0.2–1)
BUN SERPL-MCNC: 14 MG/DL (ref 6–23)
BUN/CREAT SERPL: 18 (ref 12–20)
CALCIUM SERPL-MCNC: 9.9 MG/DL (ref 8.5–10.1)
CHLORIDE SERPL-SCNC: 104 MMOL/L (ref 98–107)
CO2 SERPL-SCNC: 26 MMOL/L (ref 21–32)
CREAT SERPL-MCNC: 0.8 MG/DL (ref 0.6–1.3)
GLOBULIN SER CALC-MCNC: 2.6 G/DL (ref 2–4)
GLUCOSE SERPL-MCNC: 87 MG/DL (ref 74–108)
POTASSIUM SERPL-SCNC: 5.4 MMOL/L (ref 3.5–5.5)
PROT SERPL-MCNC: 6.3 G/DL (ref 6.4–8.2)
SODIUM SERPL-SCNC: 139 MMOL/L (ref 136–145)

## 2025-06-14 PROCEDURE — 82306 VITAMIN D 25 HYDROXY: CPT

## 2025-06-14 PROCEDURE — 36415 COLL VENOUS BLD VENIPUNCTURE: CPT

## 2025-06-14 PROCEDURE — 80053 COMPREHEN METABOLIC PANEL: CPT

## 2025-06-17 ENCOUNTER — OFFICE VISIT (OUTPATIENT)
Facility: CLINIC | Age: 65
End: 2025-06-17
Payer: COMMERCIAL

## 2025-06-17 VITALS
TEMPERATURE: 98.4 F | HEIGHT: 68 IN | HEART RATE: 78 BPM | RESPIRATION RATE: 14 BRPM | WEIGHT: 125 LBS | SYSTOLIC BLOOD PRESSURE: 134 MMHG | OXYGEN SATURATION: 97 % | DIASTOLIC BLOOD PRESSURE: 70 MMHG | BODY MASS INDEX: 18.94 KG/M2

## 2025-06-17 DIAGNOSIS — E87.5 HYPERKALEMIA: ICD-10-CM

## 2025-06-17 DIAGNOSIS — F41.9 ANXIETY: ICD-10-CM

## 2025-06-17 DIAGNOSIS — N95.2 POSTMENOPAUSAL ATROPHIC VAGINITIS: ICD-10-CM

## 2025-06-17 DIAGNOSIS — Z86.006 HISTORY OF MELANOMA IN SITU: ICD-10-CM

## 2025-06-17 DIAGNOSIS — M18.12 PRIMARY OSTEOARTHRITIS OF FIRST CARPOMETACARPAL JOINT OF LEFT HAND: ICD-10-CM

## 2025-06-17 DIAGNOSIS — R09.89 BRUIT OF LEFT CAROTID ARTERY: ICD-10-CM

## 2025-06-17 DIAGNOSIS — L64.9 ANDROGENIC ALOPECIA: ICD-10-CM

## 2025-06-17 DIAGNOSIS — M85.89 OSTEOPENIA OF MULTIPLE SITES: ICD-10-CM

## 2025-06-17 DIAGNOSIS — E78.00 PURE HYPERCHOLESTEROLEMIA: ICD-10-CM

## 2025-06-17 DIAGNOSIS — E55.9 VITAMIN D DEFICIENCY: ICD-10-CM

## 2025-06-17 DIAGNOSIS — Z00.00 ENCOUNTER FOR ROUTINE HISTORY AND PHYSICAL EXAM IN FEMALE: Primary | ICD-10-CM

## 2025-06-17 DIAGNOSIS — I10 WHITE COAT SYNDROME WITH HYPERTENSION: ICD-10-CM

## 2025-06-17 PROCEDURE — 3078F DIAST BP <80 MM HG: CPT | Performed by: INTERNAL MEDICINE

## 2025-06-17 PROCEDURE — 3075F SYST BP GE 130 - 139MM HG: CPT | Performed by: INTERNAL MEDICINE

## 2025-06-17 PROCEDURE — 99397 PER PM REEVAL EST PAT 65+ YR: CPT | Performed by: INTERNAL MEDICINE

## 2025-06-17 RX ORDER — ATORVASTATIN CALCIUM 40 MG/1
40 TABLET, FILM COATED ORAL NIGHTLY
Qty: 90 TABLET | Refills: 3 | Status: SHIPPED | OUTPATIENT
Start: 2025-06-17

## 2025-06-17 RX ORDER — SPIRONOLACTONE 50 MG/1
50 TABLET, FILM COATED ORAL DAILY
Qty: 30 TABLET | Refills: 1 | Status: SHIPPED | OUTPATIENT
Start: 2025-06-17

## 2025-06-17 RX ORDER — ESTRADIOL 0.1 MG/G
1 CREAM VAGINAL
Qty: 42.5 G | Refills: 2 | Status: SHIPPED | OUTPATIENT
Start: 2025-06-17

## 2025-06-17 NOTE — PROGRESS NOTES
Nina Felipe presents today for   Chief Complaint   Patient presents with    Annual Exam       \"Have you been to the ER, urgent care clinic since your last visit?  Hospitalized since your last visit?\"    NO    “Have you seen or consulted any other health care providers outside of [facility] since your last visit?”    NO            
smoked. She has never used smokeless tobacco. She is a . She has two adult daughters. 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 She never smoked and will drink alcohol occasionally, approximately 2 glasses of wine per month.    Social History     Substance and Sexual Activity   Alcohol Use Yes     Immunization History:  Immunization History   Administered Date(s) Administered    COVID-19, MODERNA BLUE border, Primary or Immunocompromised, (age 12y+), IM, 100 mcg/0.5mL 01/14/2021, 02/11/2021, 11/03/2021, 05/25/2022    Influenza Quadv 08/11/2020    Influenza Trivalent 09/05/2019    Influenza Virus Vaccine 08/28/2021, 09/13/2023    Influenza, FLUZONE High Dose (age 65 y+), IM, Quadv, 0.7mL 09/01/2022, 08/03/2024    Pneumococcal, PCV20, PREVNAR 20, (age 6w+), IM, 0.5mL 01/19/2023    TDaP, ADACEL (age 10y-64y), BOOSTRIX (age 10y+), IM, 0.5mL 01/01/2018, 10/30/2018, 12/02/2020    Zoster Recombinant (Shingrix) 05/11/2021, 07/20/2021       Review of Systems:   As above included in HPI.  Otherwise 11 point review of systems negative including constitutional, skin, HENT, eyes, respiratory, cardiovascular, gastrointestinal, genitourinary, musculoskeletal, endocrine, hematologic, allergy, and neurologic.    Physical:   Vitals:    06/17/25 0953 06/17/25 1041   BP: (!) 142/78 134/70   Pulse: 78    Resp: 14    Temp: 98.4 °F (36.9 °C)    TempSrc: Temporal    SpO2: 97%    Weight: 56.7 kg (125 lb)    Height: 1.727 m (5' 8\")          Exam:   Patient appears in no apparent distress. Affect is appropriate.    HEENT --Anicteric sclerae, tympanic membranes normal,  ear canals normal.  PERRL, EOMI, conjunctiva and lids normal.  No cervical lymphadenopathy.  No thyromegaly or JVD.  Prominent left carotid bruit.  Lungs --Clear to auscultation. No wheezing or rales.  Heart --Regular rate and rhythm, no murmurs, rubs, gallops, or clicks.  Abdomen -- Soft and nontender, no hepatosplenomegaly

## 2025-06-18 ENCOUNTER — PATIENT MESSAGE (OUTPATIENT)
Facility: CLINIC | Age: 65
End: 2025-06-18

## 2025-06-18 PROBLEM — I10 WHITE COAT SYNDROME WITH HYPERTENSION: Status: ACTIVE | Noted: 2024-02-11

## 2025-06-18 PROBLEM — F41.9 ANXIETY: Status: ACTIVE | Noted: 2025-06-18

## 2025-06-18 PROBLEM — F32.A DEPRESSION: Status: RESOLVED | Noted: 2021-05-04 | Resolved: 2025-06-18

## 2025-06-18 PROBLEM — L64.9 ANDROGENIC ALOPECIA: Status: ACTIVE | Noted: 2021-05-04

## 2025-06-18 PROBLEM — R09.89 BRUIT OF LEFT CAROTID ARTERY: Status: ACTIVE | Noted: 2025-06-18

## 2025-06-18 PROBLEM — E87.5 HYPERKALEMIA: Status: ACTIVE | Noted: 2025-06-18

## 2025-06-18 PROBLEM — N95.2 POSTMENOPAUSAL ATROPHIC VAGINITIS: Status: ACTIVE | Noted: 2025-06-18

## 2025-06-27 ENCOUNTER — PATIENT MESSAGE (OUTPATIENT)
Facility: CLINIC | Age: 65
End: 2025-06-27

## 2025-06-30 ENCOUNTER — PATIENT MESSAGE (OUTPATIENT)
Facility: CLINIC | Age: 65
End: 2025-06-30

## 2025-06-30 ENCOUNTER — HOSPITAL ENCOUNTER (OUTPATIENT)
Facility: HOSPITAL | Age: 65
Setting detail: SPECIMEN
Discharge: HOME | End: 2025-07-03
Payer: COMMERCIAL

## 2025-06-30 DIAGNOSIS — E87.5 HYPERKALEMIA: ICD-10-CM

## 2025-06-30 DIAGNOSIS — E55.9 VITAMIN D DEFICIENCY: ICD-10-CM

## 2025-06-30 DIAGNOSIS — E78.00 PURE HYPERCHOLESTEROLEMIA: ICD-10-CM

## 2025-06-30 LAB
25(OH)D3 SERPL-MCNC: 36.4 NG/ML (ref 30–100)
ALBUMIN SERPL-MCNC: 4.1 G/DL (ref 3.4–5)
ALBUMIN/GLOB SERPL: 1.5 (ref 0.8–1.7)
ALP SERPL-CCNC: 71 U/L (ref 45–117)
ALT SERPL-CCNC: 25 U/L (ref 10–35)
ANION GAP SERPL CALC-SCNC: 10 MMOL/L (ref 3–18)
AST SERPL-CCNC: 27 U/L (ref 10–38)
BILIRUB SERPL-MCNC: 0.7 MG/DL (ref 0.2–1)
BUN SERPL-MCNC: 13 MG/DL (ref 6–23)
BUN/CREAT SERPL: 16 (ref 12–20)
CALCIUM SERPL-MCNC: 10.1 MG/DL (ref 8.5–10.1)
CHLORIDE SERPL-SCNC: 101 MMOL/L (ref 98–107)
CHOLEST SERPL-MCNC: 148 MG/DL
CO2 SERPL-SCNC: 27 MMOL/L (ref 21–32)
CREAT SERPL-MCNC: 0.8 MG/DL (ref 0.6–1.3)
GLOBULIN SER CALC-MCNC: 2.8 G/DL (ref 2–4)
GLUCOSE SERPL-MCNC: 85 MG/DL (ref 74–108)
HDLC SERPL-MCNC: 77 MG/DL (ref 40–60)
HDLC SERPL: 1.9 (ref 0–5)
LDLC SERPL CALC-MCNC: 60 MG/DL (ref 0–100)
POTASSIUM SERPL-SCNC: 4.5 MMOL/L (ref 3.5–5.5)
PROT SERPL-MCNC: 6.9 G/DL (ref 6.4–8.2)
SODIUM SERPL-SCNC: 139 MMOL/L (ref 136–145)
TRIGL SERPL-MCNC: 59 MG/DL (ref 0–150)
VLDLC SERPL CALC-MCNC: 12 MG/DL

## 2025-06-30 PROCEDURE — 36415 COLL VENOUS BLD VENIPUNCTURE: CPT

## 2025-06-30 PROCEDURE — 80061 LIPID PANEL: CPT

## 2025-06-30 PROCEDURE — 80053 COMPREHEN METABOLIC PANEL: CPT

## 2025-06-30 PROCEDURE — 82306 VITAMIN D 25 HYDROXY: CPT

## 2025-07-09 ENCOUNTER — OFFICE VISIT (OUTPATIENT)
Facility: CLINIC | Age: 65
End: 2025-07-09
Payer: COMMERCIAL

## 2025-07-09 VITALS
HEIGHT: 68 IN | DIASTOLIC BLOOD PRESSURE: 60 MMHG | WEIGHT: 123 LBS | HEART RATE: 82 BPM | SYSTOLIC BLOOD PRESSURE: 118 MMHG | BODY MASS INDEX: 18.64 KG/M2 | TEMPERATURE: 98.2 F | OXYGEN SATURATION: 98 % | RESPIRATION RATE: 14 BRPM

## 2025-07-09 DIAGNOSIS — M85.89 OSTEOPENIA OF MULTIPLE SITES: ICD-10-CM

## 2025-07-09 DIAGNOSIS — E78.00 PURE HYPERCHOLESTEROLEMIA: Primary | ICD-10-CM

## 2025-07-09 DIAGNOSIS — E55.9 VITAMIN D DEFICIENCY: ICD-10-CM

## 2025-07-09 DIAGNOSIS — L64.9 ANDROGENIC ALOPECIA: ICD-10-CM

## 2025-07-09 DIAGNOSIS — N95.2 POSTMENOPAUSAL ATROPHIC VAGINITIS: ICD-10-CM

## 2025-07-09 DIAGNOSIS — R87.810 CERVICAL HIGH RISK HPV (HUMAN PAPILLOMAVIRUS) TEST POSITIVE: ICD-10-CM

## 2025-07-09 DIAGNOSIS — I10 WHITE COAT SYNDROME WITH HYPERTENSION: ICD-10-CM

## 2025-07-09 DIAGNOSIS — R09.89 BRUIT OF LEFT CAROTID ARTERY: ICD-10-CM

## 2025-07-09 DIAGNOSIS — E83.52 HYPERCALCEMIA: ICD-10-CM

## 2025-07-09 DIAGNOSIS — E87.5 HYPERKALEMIA: ICD-10-CM

## 2025-07-09 PROCEDURE — 3078F DIAST BP <80 MM HG: CPT | Performed by: INTERNAL MEDICINE

## 2025-07-09 PROCEDURE — 1123F ACP DISCUSS/DSCN MKR DOCD: CPT | Performed by: INTERNAL MEDICINE

## 2025-07-09 PROCEDURE — 99215 OFFICE O/P EST HI 40 MIN: CPT | Performed by: INTERNAL MEDICINE

## 2025-07-09 PROCEDURE — 3074F SYST BP LT 130 MM HG: CPT | Performed by: INTERNAL MEDICINE

## 2025-07-09 RX ORDER — ATORVASTATIN CALCIUM 40 MG/1
40 TABLET, FILM COATED ORAL NIGHTLY
Qty: 90 TABLET | Refills: 3 | Status: SHIPPED | OUTPATIENT
Start: 2025-07-09

## 2025-07-09 RX ORDER — SPIRONOLACTONE 100 MG/1
100 TABLET, FILM COATED ORAL DAILY
COMMUNITY

## 2025-07-09 NOTE — PROGRESS NOTES
HPI:   Nina Felipe is a 65 y.o. year old female who presents today for a follow-up visit.  She has a history of white coat syndrome, hyperlipidemia, insomnia, and androgenic hair loss. She reports that she is doing reasonably well.      She was last seen for a physical exam on 6/17/2025 and it was noted on her previsit labs (6/10/2025) that she had evidence of hyperkalemia (K 5.9) and hypercalcemia (Ca 10.8).  She reported that she had discontinued spironolactone after seeing her lab results, and was advised to discontinue calcium supplementation. Repeat labs (6/14/2025) showed improvement in potassium to 5.4, calcium to 9.9, and Vitamin D level was low at 29.6.  Due to concern regarding risk for hair loss, she requested to restart spironolactone and was advised to begin 50 mg daily.  She presents today for follow-up and reports that she has been tolerating this dose well but is concerned that she has noted some increased hair loss on the lower dose and inquires whether she would be able to resume spironolactone 100 mg daily.  She reports that she has a follow-up visit with her dermatologist in 8 weeks.    She reports today that she has been monitoring her blood pressure at home and although she may have occasional readings in the 140s/90s range, she reports that these elevations are transient and will usually improve upon reevaluation.  She discusses that the majority of her readings are well-controlled in the 115-130/60-75 range.  She reports that she is continuing to follow a healthy diet and exercises regularly.    At her last visit, she was noted to have a new left carotid bruit and carotid duplex ultrasound was obtained (6/27/2025) showing mild plaque (<50%) in the bilateral internal carotid arteries not associated with any hemodynamically significant stenosis.  Her lipid panel showed LDL increased to 91 and HDL 87, and her dose of atorvastatin was increased to 40 mg daily.  She has tolerated this increase

## 2025-07-09 NOTE — PROGRESS NOTES
Nina Felipe presents today for   Chief Complaint   Patient presents with    1 Month Follow-Up       \"Have you been to the ER, urgent care clinic since your last visit?  Hospitalized since your last visit?\"    NO    “Have you seen or consulted any other health care providers outside of [facility] since your last visit?”    NO

## 2025-07-12 PROBLEM — E83.52 HYPERCALCEMIA: Status: ACTIVE | Noted: 2025-07-12

## 2025-07-30 LAB
A/G RATIO: 2 RATIO (ref 1.1–2.6)
ALBUMIN: 4.8 G/DL (ref 3.5–5)
ALP BLD-CCNC: 84 U/L (ref 40–120)
ALT SERPL-CCNC: 20 U/L (ref 5–40)
ANION GAP SERPL CALCULATED.3IONS-SCNC: 13 MMOL/L (ref 3–15)
AST SERPL-CCNC: 19 U/L (ref 10–37)
BILIRUB SERPL-MCNC: 0.8 MG/DL (ref 0.2–1.2)
BUN BLDV-MCNC: 18 MG/DL (ref 6–22)
CALCIUM SERPL-MCNC: 10 MG/DL (ref 8.4–10.5)
CHLORIDE BLD-SCNC: 101 MMOL/L (ref 98–110)
CO2: 24 MMOL/L (ref 20–32)
CREAT SERPL-MCNC: 0.8 MG/DL (ref 0.8–1.4)
GFR, ESTIMATED: 75 ML/MIN/1.73 SQ.M.
GLOBULIN: 2.4 G/DL (ref 2–4)
GLUCOSE: 87 MG/DL (ref 70–99)
POTASSIUM SERPL-SCNC: 5.1 MMOL/L (ref 3.5–5.5)
SODIUM BLD-SCNC: 138 MMOL/L (ref 133–145)
TOTAL PROTEIN: 7.2 G/DL (ref 6.2–8.1)

## 2025-07-31 ENCOUNTER — PATIENT MESSAGE (OUTPATIENT)
Facility: CLINIC | Age: 65
End: 2025-07-31

## 2025-08-04 ENCOUNTER — PATIENT MESSAGE (OUTPATIENT)
Facility: CLINIC | Age: 65
End: 2025-08-04